# Patient Record
Sex: FEMALE | Race: OTHER | NOT HISPANIC OR LATINO | URBAN - METROPOLITAN AREA
[De-identification: names, ages, dates, MRNs, and addresses within clinical notes are randomized per-mention and may not be internally consistent; named-entity substitution may affect disease eponyms.]

---

## 2019-11-06 ENCOUNTER — EMERGENCY (EMERGENCY)
Facility: HOSPITAL | Age: 29
LOS: 0 days | Discharge: HOME | End: 2019-11-07
Attending: EMERGENCY MEDICINE | Admitting: EMERGENCY MEDICINE
Payer: SUBSIDIZED

## 2019-11-06 VITALS
HEART RATE: 92 BPM | SYSTOLIC BLOOD PRESSURE: 131 MMHG | OXYGEN SATURATION: 100 % | RESPIRATION RATE: 17 BRPM | TEMPERATURE: 96 F | DIASTOLIC BLOOD PRESSURE: 79 MMHG

## 2019-11-06 DIAGNOSIS — R31.9 HEMATURIA, UNSPECIFIED: ICD-10-CM

## 2019-11-06 DIAGNOSIS — R10.9 UNSPECIFIED ABDOMINAL PAIN: ICD-10-CM

## 2019-11-06 DIAGNOSIS — R10.31 RIGHT LOWER QUADRANT PAIN: ICD-10-CM

## 2019-11-06 LAB
ALBUMIN SERPL ELPH-MCNC: 4.7 G/DL — SIGNIFICANT CHANGE UP (ref 3.5–5.2)
ALP SERPL-CCNC: 40 U/L — SIGNIFICANT CHANGE UP (ref 30–115)
ALT FLD-CCNC: 12 U/L — SIGNIFICANT CHANGE UP (ref 0–41)
ANION GAP SERPL CALC-SCNC: 20 MMOL/L — HIGH (ref 7–14)
APPEARANCE UR: CLEAR — SIGNIFICANT CHANGE UP
APTT BLD: 24.7 SEC — LOW (ref 27–39.2)
AST SERPL-CCNC: 21 U/L — SIGNIFICANT CHANGE UP (ref 0–41)
BACTERIA # UR AUTO: ABNORMAL
BASOPHILS # BLD AUTO: 0.03 K/UL — SIGNIFICANT CHANGE UP (ref 0–0.2)
BASOPHILS NFR BLD AUTO: 0.5 % — SIGNIFICANT CHANGE UP (ref 0–1)
BILIRUB SERPL-MCNC: 0.2 MG/DL — SIGNIFICANT CHANGE UP (ref 0.2–1.2)
BILIRUB UR-MCNC: NEGATIVE — SIGNIFICANT CHANGE UP
BUN SERPL-MCNC: 13 MG/DL — SIGNIFICANT CHANGE UP (ref 10–20)
CALCIUM SERPL-MCNC: 9.8 MG/DL — SIGNIFICANT CHANGE UP (ref 8.5–10.1)
CHLORIDE SERPL-SCNC: 100 MMOL/L — SIGNIFICANT CHANGE UP (ref 98–110)
CO2 SERPL-SCNC: 19 MMOL/L — SIGNIFICANT CHANGE UP (ref 17–32)
COLOR SPEC: SIGNIFICANT CHANGE UP
CREAT SERPL-MCNC: 0.6 MG/DL — LOW (ref 0.7–1.5)
DIFF PNL FLD: ABNORMAL
EOSINOPHIL # BLD AUTO: 0.1 K/UL — SIGNIFICANT CHANGE UP (ref 0–0.7)
EOSINOPHIL NFR BLD AUTO: 1.6 % — SIGNIFICANT CHANGE UP (ref 0–8)
EPI CELLS # UR: 3 /HPF — SIGNIFICANT CHANGE UP (ref 0–5)
GLUCOSE SERPL-MCNC: 94 MG/DL — SIGNIFICANT CHANGE UP (ref 70–99)
GLUCOSE UR QL: NEGATIVE — SIGNIFICANT CHANGE UP
HCG SERPL-ACNC: <0.6 MIU/ML — SIGNIFICANT CHANGE UP
HCT VFR BLD CALC: 40.1 % — SIGNIFICANT CHANGE UP (ref 37–47)
HGB BLD-MCNC: 13.4 G/DL — SIGNIFICANT CHANGE UP (ref 12–16)
HYALINE CASTS # UR AUTO: 0 /LPF — SIGNIFICANT CHANGE UP (ref 0–7)
IMM GRANULOCYTES NFR BLD AUTO: 0.2 % — SIGNIFICANT CHANGE UP (ref 0.1–0.3)
INR BLD: 1.01 RATIO — SIGNIFICANT CHANGE UP (ref 0.65–1.3)
KETONES UR-MCNC: ABNORMAL
LEUKOCYTE ESTERASE UR-ACNC: NEGATIVE — SIGNIFICANT CHANGE UP
LIDOCAIN IGE QN: 25 U/L — SIGNIFICANT CHANGE UP (ref 7–60)
LYMPHOCYTES # BLD AUTO: 2.87 K/UL — SIGNIFICANT CHANGE UP (ref 1.2–3.4)
LYMPHOCYTES # BLD AUTO: 44.8 % — SIGNIFICANT CHANGE UP (ref 20.5–51.1)
MCHC RBC-ENTMCNC: 28.8 PG — SIGNIFICANT CHANGE UP (ref 27–31)
MCHC RBC-ENTMCNC: 33.4 G/DL — SIGNIFICANT CHANGE UP (ref 32–37)
MCV RBC AUTO: 86.1 FL — SIGNIFICANT CHANGE UP (ref 81–99)
MONOCYTES # BLD AUTO: 0.43 K/UL — SIGNIFICANT CHANGE UP (ref 0.1–0.6)
MONOCYTES NFR BLD AUTO: 6.7 % — SIGNIFICANT CHANGE UP (ref 1.7–9.3)
NEUTROPHILS # BLD AUTO: 2.96 K/UL — SIGNIFICANT CHANGE UP (ref 1.4–6.5)
NEUTROPHILS NFR BLD AUTO: 46.2 % — SIGNIFICANT CHANGE UP (ref 42.2–75.2)
NITRITE UR-MCNC: NEGATIVE — SIGNIFICANT CHANGE UP
NRBC # BLD: 0 /100 WBCS — SIGNIFICANT CHANGE UP (ref 0–0)
PH UR: 6.5 — SIGNIFICANT CHANGE UP (ref 5–8)
PLATELET # BLD AUTO: 304 K/UL — SIGNIFICANT CHANGE UP (ref 130–400)
POTASSIUM SERPL-MCNC: 4.4 MMOL/L — SIGNIFICANT CHANGE UP (ref 3.5–5)
POTASSIUM SERPL-SCNC: 4.4 MMOL/L — SIGNIFICANT CHANGE UP (ref 3.5–5)
PROT SERPL-MCNC: 8.1 G/DL — HIGH (ref 6–8)
PROT UR-MCNC: SIGNIFICANT CHANGE UP
PROTHROM AB SERPL-ACNC: 11.6 SEC — SIGNIFICANT CHANGE UP (ref 9.95–12.87)
RBC # BLD: 4.66 M/UL — SIGNIFICANT CHANGE UP (ref 4.2–5.4)
RBC # FLD: 13 % — SIGNIFICANT CHANGE UP (ref 11.5–14.5)
RBC CASTS # UR COMP ASSIST: 15 /HPF — HIGH (ref 0–4)
SODIUM SERPL-SCNC: 139 MMOL/L — SIGNIFICANT CHANGE UP (ref 135–146)
SP GR SPEC: 1.02 — SIGNIFICANT CHANGE UP (ref 1.01–1.02)
UROBILINOGEN FLD QL: SIGNIFICANT CHANGE UP
WBC # BLD: 6.4 K/UL — SIGNIFICANT CHANGE UP (ref 4.8–10.8)
WBC # FLD AUTO: 6.4 K/UL — SIGNIFICANT CHANGE UP (ref 4.8–10.8)
WBC UR QL: 3 /HPF — SIGNIFICANT CHANGE UP (ref 0–5)

## 2019-11-06 PROCEDURE — 99283 EMERGENCY DEPT VISIT LOW MDM: CPT

## 2019-11-06 PROCEDURE — 76856 US EXAM PELVIC COMPLETE: CPT | Mod: 26

## 2019-11-06 PROCEDURE — 74177 CT ABD & PELVIS W/CONTRAST: CPT | Mod: 26

## 2019-11-06 PROCEDURE — 99285 EMERGENCY DEPT VISIT HI MDM: CPT

## 2019-11-06 RX ORDER — SODIUM CHLORIDE 9 MG/ML
1000 INJECTION INTRAMUSCULAR; INTRAVENOUS; SUBCUTANEOUS ONCE
Refills: 0 | Status: COMPLETED | OUTPATIENT
Start: 2019-11-06 | End: 2019-11-06

## 2019-11-06 RX ORDER — MORPHINE SULFATE 50 MG/1
4 CAPSULE, EXTENDED RELEASE ORAL ONCE
Refills: 0 | Status: DISCONTINUED | OUTPATIENT
Start: 2019-11-06 | End: 2019-11-06

## 2019-11-06 RX ORDER — KETOROLAC TROMETHAMINE 30 MG/ML
15 SYRINGE (ML) INJECTION ONCE
Refills: 0 | Status: DISCONTINUED | OUTPATIENT
Start: 2019-11-06 | End: 2019-11-06

## 2019-11-06 RX ORDER — MORPHINE SULFATE 50 MG/1
2 CAPSULE, EXTENDED RELEASE ORAL ONCE
Refills: 0 | Status: DISCONTINUED | OUTPATIENT
Start: 2019-11-06 | End: 2019-11-06

## 2019-11-06 RX ADMIN — Medication 15 MILLIGRAM(S): at 19:53

## 2019-11-06 RX ADMIN — MORPHINE SULFATE 4 MILLIGRAM(S): 50 CAPSULE, EXTENDED RELEASE ORAL at 18:25

## 2019-11-06 RX ADMIN — SODIUM CHLORIDE 1000 MILLILITER(S): 9 INJECTION INTRAMUSCULAR; INTRAVENOUS; SUBCUTANEOUS at 18:25

## 2019-11-06 NOTE — CONSULT NOTE ADULT - ASSESSMENT
29y G0 with LMP 10/22, with abdominal pain, now resolved, likely with hemorrhagic ovarian cyst, cannot rule out fibroid, unlikely ovarian torsion, clinically and hemodynamically stable, no acute GYN intervention at this time    -Recommend Naproxen 500mg [id number]  -Follow up transvaginal sonogram on Friday 11/8- GYN team to make appointment  -Pain precautions  -Dispo per ED    Discussed with Dr. Barton and Dr. Valdemar Lofton

## 2019-11-06 NOTE — CONSULT NOTE ADULT - SUBJECTIVE AND OBJECTIVE BOX
Chief complaint: abdominal cramping    29y G0 with LMP 10/22, presents to the ED with abdominal cramping with radiation to the perispinal region. Patient reports lower abdominal cramps started 4 days ago, intermittent, varying in intensity, became 10/10 which prompted her to come in. She denies fevers, chills, chest pain, N/V, SOB, palpitations, urinary symptoms, changes in her bowel habits.  Ob/Gyn History:  G0                 LMP 10/22                Patient on Tri-lo Sprintec for contraception  Denies history of ovarian cysts, uterine fibroids, abnormal paps, or STIs  Denies the following: constitutional symptoms, visual symptoms, cardiovascular symptoms, respiratory symptoms, GI symptoms, musculoskeletal symptoms, skin symptoms, neurologic symptoms, hematologic symptoms, allergic symptoms, psychiatric symptoms  Except any pertinent positives listed.     PAST MEDICAL & SURGICAL HISTORY: None    Home Medications: Tri-lo- sprintec    Allergies: No Known Allergies    FAMILY HISTORY:    SOCIAL HISTORY: Denies cigarette use, alcohol use, or illicit drug use    Vital Signs Last 24 Hrs  T(F): 96.1 (2019 17:42), Max: 96.1 (2019 17:42)  HR: 92 (2019 17:42) (92 - 92)  BP: 131/79 (2019 17:42) (131/79 - 131/79)  RR: 17 (2019 17:42) (17 - 17)    General Appearance - AAOx3, NAD  Heart - S1S2 regular rate and rhythm  Lung - CTA Bilaterally  Abdomen - Soft, nontender, nondistended, no rebound, no rigidity, no guarding, bowel sounds present    GYN/Pelvis:    External genitalia appears normal  Speculum: cervix appears normal, no lesions masses or bleeding noted  Bimanual: No CMT. Uterus normal in size, non-tender, no adnexal masses or tenderness.      LABS:                        13.4   6.40  )-----------( 304      ( 2019 18:20 )             40.1     HCG Quantitative, Serum: <0.6 mIU/mL (19 @ 18:20)          139  |  100  |  13  ----------------------------<  94  4.4   |  19  |  0.6<L>    Ca    9.8      2019 18:20    TPro  8.1<H>  /  Alb  4.7  /  TBili  0.2  /  DBili  x   /  AST  21  /  ALT  12  /  AlkPhos  40  11-    PT/INR - ( 2019 18:20 )   PT: 11.60 sec;   INR: 1.01 ratio         PTT - ( 2019 18:20 )  PTT:24.7 sec  Urinalysis Basic - ( 2019 18:30 )    Color: Light Yellow / Appearance: Clear / S.018 / pH: x  Gluc: x / Ketone: Small  / Bili: Negative / Urobili: <2 mg/dL   Blood: x / Protein: Trace / Nitrite: Negative   Leuk Esterase: Negative / RBC: 15 /HPF / WBC 3 /HPF   Sq Epi: x / Non Sq Epi: 3 /HPF / Bacteria: Moderate          RADIOLOGY & ADDITIONAL STUDIES:    EXAM:  US PELVIC COMPLETE            PROCEDURE DATE:  2019      INTERPRETATION:  CLINICAL HISTORY: Right pelvic mass.    LMP: 10/23/2019.    FINDINGS:  UTERUS: Anteverted measuring 8.1 x 4.4 x 3.9 cm, with normal echogenicity   and morphology. The endometrial echo complex measures 0.6 cm, which is   normal n thickness.   RIGHT OVARY: Vascular adnexal heterogeneous structure measuring 4.8 x 4.4   x 4.2 cm. Doppler flow is demonstrated to the right ovary.   LEFT OVARY: Not visualized.  OTHER: No free fluid in the pelvis.      IMPRESSION:  Right adnexal heterogeneous structure measuring 4.8 cm. Follow-up with   MRI with and without contrast may be obtained for further evaluation.        EXAM:  CT ABDOMEN AND PELVIS IC            PROCEDURE DATE:  2019    INTERPRETATION:  Clinical History / Reason for exam: Right flank pain  Comparison CT: None    FINDINGS:    LOWER CHEST: There is mild bibasilar subsegmental atelectasis.    HEPATOBILIARY: The liver is normal in appearance.  No evidence of intra   or extrahepatic biliary dilatation. The gallbladder is suboptimally   imaged.    KIDNEYS:Symmetric pattern of renal enhancement. No evidence of a mass,   hydronephrosis or hydroureter.    ABDOMINOPELVIC NODES: No enlarged abdominal or pelvic lymph nodes.  PELVIC ORGANS: Arising from the right adnexa or anterior margin of the   uterus, there is a high density ovoid well-circumscribed mass measuring   6.4 x 3.8 cm.  PERITONEUM/MESENTERY/BOWEL: No bowel obstruction, ascites or free   intraperitoneal air. The appendix is normal in caliber  BONES/SOFT TISSUES: L1-L2 posterior disc osteophyte complex is noted.   VASCULAR:  The aorta is normal in caliber.  IMPRESSION:  Mass arising from the right adnexa or uterus may reflect a fibroid or   hemorrhagic cyst. Further evaluation with pelvic ultrasound and/or MRI is   recommended.

## 2019-11-06 NOTE — ED PROVIDER NOTE - PHYSICAL EXAMINATION
Vital Signs: I have reviewed the initial vital signs.  Constitutional: NAD, well-nourished, appears stated age, no acute distress.  HEENT: Airway patent, moist MM, no erythema/swelling/deformity of oral structures. EOMI, PERRLA.  CV: regular rate, regular rhythm, well-perfused extremities, 2+ b/l DP and radial pulses equal.  Lungs: BCTA, no increased WOB.  ABD: suprapubic discomfort to palpation.  NTND, no guarding or rebound, no pulsatile mass, no hernias.   MSK: Neck supple, nontender, nl ROM, no stepoff. Chest nontender. Back nontender in TLS spine or to b/l bony structures or flanks. Ext nontender, nl rom, no deformity. R CVA ttp  INTEG: Skin warm, dry, no rash.  NEURO: A&Ox3, moving all extremities, normal speech  PSYCH: Calm, cooperative, normal affect and interaction.

## 2019-11-06 NOTE — ED PROVIDER NOTE - ATTENDING CONTRIBUTION TO CARE
29 year old female, no sig pmhx, come sin with right flank pain, dull, non radiating, no dysuria, + mild on and off blood in urine, no cp/sob, no fever, no n/v/d, no cp/sob    CONSTITUTIONAL: Well-developed; well-nourished; in no acute distress. Sitting up and providing appropriate history and physical examination  SKIN: skin exam is warm and dry, no acute rash.  HEAD: Normocephalic; atraumatic.  EYES: PERRL, 3 mm bilateral, no nystagmus, EOM intact; conjunctiva and sclera clear.  ENT: No nasal discharge; airway clear.  NECK: Supple; non tender. + full passive ROM in all directions. No JVD  ABD: soft; non-distended; + right CVA tenderness. No Rebound, No Guarding, No signs of peritonitis, No left CVA tenderness. No pulsatile abdominal mass. + Strong and Symmetric Pulses  EXT: Normal ROM. No clubbing, cyanosis or edema. Dp and Pt Pulses intact. Cap refill less than 3 seconds  NEURO: CN 2-12 intact, normal finger to nose, normal romberg, stable gait, no sensory or motor deficits, Alert, oriented, grossly unremarkable. No Focal deficits. GCS 15. NIH 0  PSYCH: Cooperative, appropriate.

## 2019-11-06 NOTE — ED PROVIDER NOTE - NSFOLLOWUPCLINICS_GEN_ALL_ED_FT
[facility]  OB/GYN  [address] [place] [address]  Phone: [phone]  Fax:   Follow Up Time: 4-6 Days

## 2019-11-06 NOTE — ED PROVIDER NOTE - PATIENT PORTAL LINK FT
You can access the FollowMyHealth Patient Portal offered by [facility] by registering at the following website: [email or web address]. By joining [employer]’s FollowMyHealth portal, you will also be able to view your health information using other applications (apps) compatible with our system.

## 2019-11-06 NOTE — ED PROVIDER NOTE - OBJECTIVE STATEMENT
29y G0, lmp 10/20 p/w mild suprapubic intermittent cramping abd pain radiating to R back X 4 days, gradual onset, sometimes waking her from sleep.   no fevers, chills, chest pain, N/V/D, SOB, palpitations, urinary symptoms, changes in her bowel habits.

## 2019-11-06 NOTE — ED PROVIDER NOTE - MDM PATIENT STATEMENT FOR ADDL TREATMENT
<<-----Click here for Discharge Medication Review
Patient with one or more new problems requiring additional work-up/treatment.

## 2019-11-06 NOTE — ED PROVIDER NOTE - CLINICAL SUMMARY MEDICAL DECISION MAKING FREE TEXT BOX
I personally evaluated the patient. I reviewed the Resident’s or Physician Assistant’s note (as assigned above), and agree with the findings and plan except as documented in my note. Patient evaluated for 3 days of on and off abdominal pain, found to have a heterogenous mass on right adenxa, + blood flow to ovaries, no torsion, however patient declined transvaginal US despite obgyn recommendation. Patient declined admission, evaluated by obgyn, opted for OP MRI. I have fully discussed the medical management and delivery of care with the patient. I have discussed any available labs, imaging and treatment options with the patient. Patient confirms understanding and has been given detailed return precautions. Patient instructed to return to the ED should symptoms persist or worsen. Patient has demonstrated capacity and has verbalized understanding. Patient is well appearing upon discharge.

## 2019-11-06 NOTE — ED PROVIDER NOTE - NS ED ROS FT
Eyes:  No visual changes, eye pain or discharge.  ENMT:  No hearing changes, pain, no sore throat or runny nose, no difficulty swallowing  Cardiac:  No chest pain, SOB or edema. No chest pain with exertion.  Respiratory:  No cough or respiratory distress.    GI:  No nausea, vomiting, diarrhea . +abdominal pain.  :  No dysuria, frequency or burning.  MS:  No myalgia, muscle weakness, joint pain . +back pain.  Neuro:  No headache or weakness.  No LOC.  Skin:  No skin rash.   Endocrine: No history of thyroid disease or diabetes.

## 2019-11-06 NOTE — ED ADULT NURSE NOTE - NSIMPLEMENTINTERV_GEN_ALL_ED
137/current
Implemented All Universal Safety Interventions:  [place] to call system. Call bell, personal items and telephone within reach. Instruct patient to call for assistance. Room bathroom lighting operational. Non-slip footwear when patient is off stretcher. Physically safe environment: no spills, clutter or unnecessary equipment. Stretcher in lowest position, wheels locked, appropriate side rails in place.

## 2019-11-08 ENCOUNTER — OUTPATIENT (OUTPATIENT)
Dept: OUTPATIENT SERVICES | Facility: HOSPITAL | Age: 29
LOS: 1 days | Discharge: HOME | End: 2019-11-08

## 2019-11-08 ENCOUNTER — APPOINTMENT (OUTPATIENT)
Dept: GYNECOLOGIC ONCOLOGY | Facility: CLINIC | Age: 29
End: 2019-11-08
Payer: COMMERCIAL

## 2019-11-08 VITALS
DIASTOLIC BLOOD PRESSURE: 78 MMHG | TEMPERATURE: 98.2 F | WEIGHT: 136.69 LBS | HEIGHT: 62.99 IN | SYSTOLIC BLOOD PRESSURE: 126 MMHG | BODY MASS INDEX: 24.22 KG/M2

## 2019-11-08 DIAGNOSIS — R19.00 INTRA-ABDOMINAL AND PELVIC SWELLING, MASS AND LUMP, UNSPECIFIED SITE: ICD-10-CM

## 2019-11-08 DIAGNOSIS — Z78.9 OTHER SPECIFIED HEALTH STATUS: ICD-10-CM

## 2019-11-08 PROCEDURE — 99203 OFFICE O/P NEW LOW 30 MIN: CPT | Mod: 25

## 2019-11-08 NOTE — PAST MEDICAL HISTORY
[Definite ___ (Date)] : the last menstrual period was [unfilled] [Regular Cycle Intervals] : have been regular [Normal Duration] : the duration was normal

## 2019-11-08 NOTE — DISCUSSION/SUMMARY
[FreeTextEntry1] : 29-year-old female  presented to the emergency room on  with right flank pain and right pelvic pain with a pain score of 10 out of 10. Her workup with CT scan and sonography revealed a 4 x 5 cm right hemorrhagic ovarian cyst. The patient presents for a followup evaluation today. She denies any pain vaginal bleeding or vaginal discharge.\par Options for surgical management as well as conservative management discussed. Procedures offered patient included diagnostic laparoscopy, with possible ovarian cystectomy vs clinical follow up with a repeat sonogram in 2 months.\par The patient was informed that if her symptoms recur that diagnostic laparoscopy would be recommended. However in the absence of any pain or discomfort a repeat sonogram is warranted in 2 months to assess for spontaneous resolution of the above hemorrhagic cyst. The patient will be set up for repeat sonogram in a followup visit in 2 months.  A CA-125 was also requested.\par \par \par \par \par \par \par

## 2019-11-08 NOTE — HISTORY OF PRESENT ILLNESS
[FreeTextEntry1] : 29-year-old female  presented to the emergency room on  with right flank pain and right pelvic pain with a pain score of 10 out of 10. Her workup with CT scan and sonography revealed a 4 x 5 cm right hemorrhagic ovarian cyst. The patient presents for a followup evaluation today. She denies any pain vaginal bleeding or vaginal discharge.\par \par

## 2019-11-08 NOTE — OB HISTORY
[Total Preg ___] : : [unfilled] [Living ___] : [unfilled] (living) [Definite ___ (Date)] : the last menstrual period was [unfilled] [Menarche Age ____] : age at menarche was [unfilled]

## 2019-11-12 ENCOUNTER — APPOINTMENT (OUTPATIENT)
Dept: ANTEPARTUM | Facility: CLINIC | Age: 29
End: 2019-11-12

## 2019-11-20 ENCOUNTER — OUTPATIENT (OUTPATIENT)
Dept: OUTPATIENT SERVICES | Facility: HOSPITAL | Age: 29
LOS: 1 days | Discharge: HOME | End: 2019-11-20

## 2019-11-21 DIAGNOSIS — N83.201 UNSPECIFIED OVARIAN CYST, RIGHT SIDE: ICD-10-CM

## 2020-01-30 ENCOUNTER — EMERGENCY (EMERGENCY)
Facility: HOSPITAL | Age: 30
LOS: 0 days | Discharge: HOME | End: 2020-01-30
Attending: EMERGENCY MEDICINE | Admitting: EMERGENCY MEDICINE
Payer: COMMERCIAL

## 2020-01-30 VITALS
HEART RATE: 95 BPM | SYSTOLIC BLOOD PRESSURE: 121 MMHG | RESPIRATION RATE: 20 BRPM | TEMPERATURE: 98 F | OXYGEN SATURATION: 99 % | DIASTOLIC BLOOD PRESSURE: 58 MMHG

## 2020-01-30 DIAGNOSIS — S82.892A OTHER FRACTURE OF LEFT LOWER LEG, INITIAL ENCOUNTER FOR CLOSED FRACTURE: ICD-10-CM
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
## 2020-01-30 DIAGNOSIS — M25.572 PAIN IN LEFT ANKLE AND JOINTS OF LEFT FOOT: ICD-10-CM
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
## 2020-01-30 PROCEDURE — 73600 X-RAY EXAM OF ANKLE: CPT | Mod: 26,52,59,LT

## 2020-01-30 PROCEDURE — 73610 X-RAY EXAM OF ANKLE: CPT | Mod: 26,LT

## 2020-01-30 PROCEDURE — 99284 EMERGENCY DEPT VISIT MOD MDM: CPT

## 2020-01-30 RX ORDER — IBUPROFEN 200 MG
1 TABLET ORAL
Qty: 30 | Refills: 0
Start: 2020-01-30 | End: 2020-02-08

## 2020-01-30 RX ORDER — IBUPROFEN 200 MG
600 TABLET ORAL ONCE
Refills: 0 | Status: COMPLETED | OUTPATIENT
Start: 2020-01-30 | End: 2020-01-30

## 2020-01-30 RX ADMIN — Medication 600 MILLIGRAM(S): at 11:43

## 2020-01-30 NOTE — ED PROVIDER NOTE - NSFOLLOWUPINSTRUCTIONS_ED_ALL_ED_FT
Ankle Fracture    WHAT YOU NEED TO KNOW:    An ankle fracture is a break in 1 or more of the bones in your ankle. Foot Anatomy         DISCHARGE INSTRUCTIONS:    Call 911 for any of the following:     You feel lightheaded, short of breath, and have chest pain.      You cough up blood.    Return to the emergency department if:     Your leg feels warm, tender, and painful. It may look swollen and red.      Blood soaks through your bandage.      You have severe pain in your ankle.      Your cast feels too tight.      Your foot or toes are cold or numb.      Your foot or toenails turn blue or gray.    Contact your healthcare provider if:     Your splint feels too tight.      Your swelling has increased or returned.      You have a fever.      Your pain does not go away, even after treatment.      You have questions or concerns about your condition or care.    Medicines: You may need any of the following:     Acetaminophen decreases pain and fever. It is available without a doctor's order. Ask how much to take and how often to take it. Follow directions. Acetaminophen can cause liver damage if not taken correctly.      NSAIDs, such as ibuprofen, help decrease swelling, pain, and fever. This medicine is available with or without a doctor's order. NSAIDs can cause stomach bleeding or kidney problems in certain people. If you take blood thinner medicine, always ask your healthcare provider if NSAIDs are safe for you. Always read the medicine label and follow directions.      Prescription pain medicine may be given. Ask your healthcare provider how to take this medicine safely.      Take your medicine as directed. Contact your healthcare provider if you think your medicine is not helping or if you have side effects. Tell him or her if you are allergic to any medicine. Keep a list of the medicines, vitamins, and herbs you take. Include the amounts, and when and why you take them. Bring the list or the pill bottles to follow-up visits. Carry your medicine list with you in case of an emergency.    Follow up with your healthcare provider in 1 to 2 days: Your fracture may need to be reduced (bones pushed back into place) or you may need surgery. Write down your questions so you remember to ask them during your visits.     Support devices: You will be given a brace, cast, or splint to limit your movement and protect your ankle. You may need to use crutches to protect your ankle and decrease your pain as you move around. Do not remove your device and do not put weight on your injured ankle.    Splint and cast care: Cover the splint or cast before you bathe so it does not get wet. Tape 2 plastic trash bags to your skin above the cast. Try to keep your ankle out of the water as much as possible.    Rest: Rest your ankle so that it can heal. Return to normal activities as directed.    Ice: Apply ice on your ankle for 15 to 20 minutes every hour or as directed. Use an ice pack, or put crushed ice in a plastic bag. Cover it with a towel. Ice helps prevent tissue damage and decreases swelling and pain.    Elevate: Elevate your ankle above the level of your heart as often as you can. This will help decrease swelling and pain. Prop your ankle on pillows or blankets to keep it elevated comfortably.        © Copyright [employer] 2019 All illustrations and images included in CareNotes are the copyrighted property of A.[employer].A[employer]M., Inc[employer] or [employer].

## 2020-01-30 NOTE — CONSULT NOTE ADULT - SUBJECTIVE AND OBJECTIVE BOX
Orthopaedics Consult Note    OLIVIA GOODE  [id number]    Time consult called: 1130  Time patient seen: 1200    Patient is a 29y year old Female with left ankle fracture  She fell down the stairs  Complained of left ankle pain  XR showed left distal fibula fracture  Closed injury  Stress XR done by Ortho negative for medical clear space widening or syndesmosis injury    PMH/PSH  ^INJURED ANKLE  MEWS Score  INJURED ANKLE  84      Medications      Allergies  No Known Allergies        T(C): 36.9 (01-30-20 @ 10:35), Max: 36.9 (01-30-20 @ 10:35)  HR: 95 (01-30-20 @ 10:35) (95 - 95)  BP: 121/58 (01-30-20 @ 10:35) (121/58 - 121/58)  RR: 20 (01-30-20 @ 10:35) (20 - 20)  SpO2: 99% (01-30-20 @ 10:35) (99% - 99%)    Physical Exam  NAD  Breathing comfortably on RA  Resting comfortably  LLE  TTP over left ankle lateral malleolus only  No open wounds  Moderate swelling  No deformity/laceration/abrasion noted  No swelling/erythema/ecchymosis noted  Motor: TA/EHL/Gastroc intact  Sensory: SP/DP/Smith/Sa intact  Vasc: foot WWP, 2+ DP pulse    Img  Left ankle XR  Left distal fibula fracture  No other fracture  No talar subluxation on stress XR    Procedure  Left leg short leg cast applied    A/P: Patient is a 29y year old Female with left ankle fracture s/p short leg walking cast application    WBAT on LLE  Cast care instructions provided  Return to clinic: Orthopaedics with Dr. Bullock/Demetrio/Adam, please call [phone] to schedule an appointment for next week  Return to ED with uncontrolled pain/bleeding/fever/chills/numbness/tingling/cool extremity/inability to move extremity

## 2020-01-30 NOTE — ED PROVIDER NOTE - OBJECTIVE STATEMENT
28 y/o F, no significant PMHx, presents to the ED s/p mechanical fall with complaints of left ankle pain. Patient tripped and fell down approximately three stairs landing with her left foot in a dorsiflexed position. She admits to immediate swelling and increased pain upon ambulation; denies paresthesias, skin color changes and additional injuries.

## 2020-01-30 NOTE — ED PROVIDER NOTE - MUSCULOSKELETAL, MLM
+ tenderness and swelling along left lateral malleolus w/ increased pain upon dorsiflexion ; no tenderness along foot ;

## 2020-01-30 NOTE — ED ADULT NURSE NOTE - NSIMPLEMENTINTERV_GEN_ALL_ED
Implemented All Fall Risk Interventions:  [place] to call system. Call bell, personal items and telephone within reach. Instruct patient to call for assistance. Room bathroom lighting operational. Non-slip footwear when patient is off stretcher. Physically safe environment: no spills, clutter or unnecessary equipment. Stretcher in lowest position, wheels locked, appropriate side rails in place. Provide visual cue, wrist band, yellow gown, etc. Monitor gait and stability. Monitor for mental status changes and reorient to person, place, and time. Review medications for side effects contributing to fall risk. Reinforce activity limits and safety measures with patient and family.

## 2020-01-30 NOTE — ED PROVIDER NOTE - PATIENT PORTAL LINK FT
You can access the FollowMyHealth Patient Portal offered by [facility] by registering at the following website: [email or web address]. By joining [employer]’s FollowMyHealth portal, you will also be able to view your health information using other applications (apps) compatible with our system.

## 2020-01-30 NOTE — ED ADULT TRIAGE NOTE - CHIEF COMPLAINT QUOTE
s/p fall down 3 steps, patient reports missing the step. patient denies any LOC or anticoagulants. patient c/o left ankle pain, immobilization in place, PTA

## 2020-01-30 NOTE — ED ADULT NURSE NOTE - OBJECTIVE STATEMENT
Left lateral ankle swelling and tenderness s/p mechanical fall. good pulses and cap refill no obvious deformity. poor active ROM.  patient took percocet PTA . mild relief

## 2020-02-13 ENCOUNTER — OUTPATIENT (OUTPATIENT)
Dept: OUTPATIENT SERVICES | Facility: HOSPITAL | Age: 30
LOS: 1 days | Discharge: HOME | End: 2020-02-13
Payer: COMMERCIAL

## 2020-02-13 DIAGNOSIS — N83.201 UNSPECIFIED OVARIAN CYST, RIGHT SIDE: ICD-10-CM

## 2020-02-13 PROCEDURE — 76856 US EXAM PELVIC COMPLETE: CPT | Mod: 26

## 2020-02-13 PROCEDURE — 76830 TRANSVAGINAL US NON-OB: CPT | Mod: 26

## 2020-02-18 ENCOUNTER — APPOINTMENT (OUTPATIENT)
Dept: GYNECOLOGIC ONCOLOGY | Facility: CLINIC | Age: 30
End: 2020-02-18

## 2020-02-25 ENCOUNTER — APPOINTMENT (OUTPATIENT)
Dept: GYNECOLOGIC ONCOLOGY | Facility: CLINIC | Age: 30
End: 2020-02-25
Payer: COMMERCIAL

## 2020-02-25 VITALS
WEIGHT: 140 LBS | DIASTOLIC BLOOD PRESSURE: 70 MMHG | SYSTOLIC BLOOD PRESSURE: 110 MMHG | TEMPERATURE: 98.1 F | HEIGHT: 62 IN | BODY MASS INDEX: 25.76 KG/M2

## 2020-02-25 VITALS
SYSTOLIC BLOOD PRESSURE: 112 MMHG | BODY MASS INDEX: 25.03 KG/M2 | DIASTOLIC BLOOD PRESSURE: 72 MMHG | HEIGHT: 62 IN | WEIGHT: 136 LBS | TEMPERATURE: 98 F

## 2020-02-25 DIAGNOSIS — Z87.898 PERSONAL HISTORY OF OTHER SPECIFIED CONDITIONS: ICD-10-CM

## 2020-02-25 PROCEDURE — 99213 OFFICE O/P EST LOW 20 MIN: CPT

## 2020-02-28 PROBLEM — Z87.898 HISTORY OF RIGHT FLANK PAIN: Status: RESOLVED | Noted: 2019-11-08 | Resolved: 2020-02-28

## 2020-02-28 NOTE — ASSESSMENT
[FreeTextEntry1] : 29-year-old female  with a history of pelvic pain secondary to a hemorrhagic  ovarian  neoplasm which appears to be resolved. Now presenting with a likely pedunculated myoma requesting further management.\par Sonogram performed 2020 revealed a uterus that is anteverted measuring 10.9 x 5 x 3.9 cm. A solid mass measuring 4.5 x 6.3 x 3.9 cm in the region of the right adnexa adhered to the uterus was noted to be stable in size since 2019. The  right ovary measured 2.6 x 1.7 x 1.4 cm and was unremarkable. The left ovary was 2.9 x 2.7 x 1.4 cm with punctate calcification measuring 3 mm. Doppler flow was demonstrated. Impression was that this mass is consistent with a pedunculated uterine fibroid. An MRI was recommended with and without intravenous contrast for confirmation.\par The patient states that she is pain free,  except for occasional discomfort  during intercourse alleviated by NSAID.  The patient is requesting an MRI to assess the above mass. She  is currently taking OC to prevent pregnancy at this time.\par \par PLAN\par -F/U Visit in 6 weeks\par -Set up for MRI of pelvis \par -D/C Oc's for now.\par -No indication for surgical intervention at this time.\par \par \par \par

## 2020-02-28 NOTE — HISTORY OF PRESENT ILLNESS
[FreeTextEntry1] : 29-year-old female  presented to the emergency room on  with right flank pain and right pelvic pain with a pain score of 10 out of 10. Her workup with CT scan and sonography revealed a 4 x 5 cm right hemorrhagic ovarian cyst. The patient presents for a followup evaluation today. She denies any pain vaginal bleeding or vaginal discharge.\par \par Sonogram performed 2020 revealed a uterus that is anteverted measuring 10.9 x 5 x 3.9 cm. A solid mass measuring 4.5 x 6.3 x 3.9 cm in the region of the right adnexa adhered to the uterus was noted to be stable in size since 2019. The  right ovary measured 2.6 x 1.7 x 1.4 cm and was unremarkable. The left ovary was 2.9 x 2.7 x 1.4 cm with punctate calcification measuring 3 mm. Doppler flow was demonstrated. Impression was that this mass is consistent with a pedunculated uterine fibroid. An MRI was recommended with and without intravenous contrast for confirmation.\par The patient states that she is pain free,  except for occasional discomfort  during intercourse alleviated by NSAID.  The patient is requesting an MRI to assess the above mass. She  is currently taking OC to prevent pregnancy at this time.

## 2020-08-07 ENCOUNTER — EMERGENCY (EMERGENCY)
Facility: HOSPITAL | Age: 30
LOS: 0 days | Discharge: HOME | End: 2020-08-07
Attending: EMERGENCY MEDICINE | Admitting: EMERGENCY MEDICINE
Payer: COMMERCIAL

## 2020-08-07 VITALS
HEIGHT: 64 IN | RESPIRATION RATE: 18 BRPM | HEART RATE: 78 BPM | TEMPERATURE: 99 F | WEIGHT: 164.91 LBS | DIASTOLIC BLOOD PRESSURE: 80 MMHG | SYSTOLIC BLOOD PRESSURE: 130 MMHG | OXYGEN SATURATION: 100 %

## 2020-08-07 VITALS
RESPIRATION RATE: 16 BRPM | TEMPERATURE: 98 F | SYSTOLIC BLOOD PRESSURE: 113 MMHG | HEART RATE: 85 BPM | OXYGEN SATURATION: 100 % | DIASTOLIC BLOOD PRESSURE: 68 MMHG

## 2020-08-07 DIAGNOSIS — R10.9 UNSPECIFIED ABDOMINAL PAIN: ICD-10-CM

## 2020-08-07 DIAGNOSIS — O34.11 MATERNAL CARE FOR BENIGN TUMOR OF CORPUS UTERI, FIRST TRIMESTER: ICD-10-CM

## 2020-08-07 LAB
ALBUMIN SERPL ELPH-MCNC: 4.3 G/DL — SIGNIFICANT CHANGE UP (ref 3.5–5.2)
ALP SERPL-CCNC: 49 U/L — SIGNIFICANT CHANGE UP (ref 30–115)
ALT FLD-CCNC: 14 U/L — SIGNIFICANT CHANGE UP (ref 0–41)
ANION GAP SERPL CALC-SCNC: 12 MMOL/L — SIGNIFICANT CHANGE UP (ref 7–14)
APPEARANCE UR: CLEAR — SIGNIFICANT CHANGE UP
AST SERPL-CCNC: 22 U/L — SIGNIFICANT CHANGE UP (ref 0–41)
BACTERIA # UR AUTO: NEGATIVE — SIGNIFICANT CHANGE UP
BASOPHILS # BLD AUTO: 0.03 K/UL — SIGNIFICANT CHANGE UP (ref 0–0.2)
BASOPHILS NFR BLD AUTO: 0.4 % — SIGNIFICANT CHANGE UP (ref 0–1)
BILIRUB SERPL-MCNC: 0.3 MG/DL — SIGNIFICANT CHANGE UP (ref 0.2–1.2)
BILIRUB UR-MCNC: NEGATIVE — SIGNIFICANT CHANGE UP
BLD GP AB SCN SERPL QL: SIGNIFICANT CHANGE UP
BUN SERPL-MCNC: 16 MG/DL — SIGNIFICANT CHANGE UP (ref 10–20)
CALCIUM SERPL-MCNC: 9.2 MG/DL — SIGNIFICANT CHANGE UP (ref 8.5–10.1)
CHLORIDE SERPL-SCNC: 103 MMOL/L — SIGNIFICANT CHANGE UP (ref 98–110)
CO2 SERPL-SCNC: 21 MMOL/L — SIGNIFICANT CHANGE UP (ref 17–32)
COLOR SPEC: SIGNIFICANT CHANGE UP
CREAT SERPL-MCNC: 0.6 MG/DL — LOW (ref 0.7–1.5)
DIFF PNL FLD: ABNORMAL
EOSINOPHIL # BLD AUTO: 0.18 K/UL — SIGNIFICANT CHANGE UP (ref 0–0.7)
EOSINOPHIL NFR BLD AUTO: 2.3 % — SIGNIFICANT CHANGE UP (ref 0–8)
EPI CELLS # UR: 1 /HPF — SIGNIFICANT CHANGE UP (ref 0–5)
GLUCOSE SERPL-MCNC: 98 MG/DL — SIGNIFICANT CHANGE UP (ref 70–99)
GLUCOSE UR QL: NEGATIVE — SIGNIFICANT CHANGE UP
HCG SERPL-ACNC: 2422 MIU/ML — HIGH
HCT VFR BLD CALC: 37.3 % — SIGNIFICANT CHANGE UP (ref 37–47)
HGB BLD-MCNC: 12.5 G/DL — SIGNIFICANT CHANGE UP (ref 12–16)
HYALINE CASTS # UR AUTO: 1 /LPF — SIGNIFICANT CHANGE UP (ref 0–7)
IMM GRANULOCYTES NFR BLD AUTO: 0.4 % — HIGH (ref 0.1–0.3)
KETONES UR-MCNC: NEGATIVE — SIGNIFICANT CHANGE UP
LACTATE SERPL-SCNC: 0.9 MMOL/L — SIGNIFICANT CHANGE UP (ref 0.7–2)
LEUKOCYTE ESTERASE UR-ACNC: NEGATIVE — SIGNIFICANT CHANGE UP
LIDOCAIN IGE QN: 26 U/L — SIGNIFICANT CHANGE UP (ref 7–60)
LYMPHOCYTES # BLD AUTO: 3.4 K/UL — SIGNIFICANT CHANGE UP (ref 1.2–3.4)
LYMPHOCYTES # BLD AUTO: 43.4 % — SIGNIFICANT CHANGE UP (ref 20.5–51.1)
MCHC RBC-ENTMCNC: 28.8 PG — SIGNIFICANT CHANGE UP (ref 27–31)
MCHC RBC-ENTMCNC: 33.5 G/DL — SIGNIFICANT CHANGE UP (ref 32–37)
MCV RBC AUTO: 85.9 FL — SIGNIFICANT CHANGE UP (ref 81–99)
MONOCYTES # BLD AUTO: 0.64 K/UL — HIGH (ref 0.1–0.6)
MONOCYTES NFR BLD AUTO: 8.2 % — SIGNIFICANT CHANGE UP (ref 1.7–9.3)
NEUTROPHILS # BLD AUTO: 3.56 K/UL — SIGNIFICANT CHANGE UP (ref 1.4–6.5)
NEUTROPHILS NFR BLD AUTO: 45.3 % — SIGNIFICANT CHANGE UP (ref 42.2–75.2)
NITRITE UR-MCNC: NEGATIVE — SIGNIFICANT CHANGE UP
NRBC # BLD: 0 /100 WBCS — SIGNIFICANT CHANGE UP (ref 0–0)
PH UR: 6 — SIGNIFICANT CHANGE UP (ref 5–8)
PLATELET # BLD AUTO: 290 K/UL — SIGNIFICANT CHANGE UP (ref 130–400)
POTASSIUM SERPL-MCNC: 4.4 MMOL/L — SIGNIFICANT CHANGE UP (ref 3.5–5)
POTASSIUM SERPL-SCNC: 4.4 MMOL/L — SIGNIFICANT CHANGE UP (ref 3.5–5)
PROT SERPL-MCNC: 7.2 G/DL — SIGNIFICANT CHANGE UP (ref 6–8)
PROT UR-MCNC: NEGATIVE — SIGNIFICANT CHANGE UP
RBC # BLD: 4.34 M/UL — SIGNIFICANT CHANGE UP (ref 4.2–5.4)
RBC # FLD: 13.1 % — SIGNIFICANT CHANGE UP (ref 11.5–14.5)
RBC CASTS # UR COMP ASSIST: 3 /HPF — SIGNIFICANT CHANGE UP (ref 0–4)
SODIUM SERPL-SCNC: 136 MMOL/L — SIGNIFICANT CHANGE UP (ref 135–146)
SP GR SPEC: 1.01 — SIGNIFICANT CHANGE UP (ref 1.01–1.02)
UROBILINOGEN FLD QL: SIGNIFICANT CHANGE UP
WBC # BLD: 7.84 K/UL — SIGNIFICANT CHANGE UP (ref 4.8–10.8)
WBC # FLD AUTO: 7.84 K/UL — SIGNIFICANT CHANGE UP (ref 4.8–10.8)
WBC UR QL: 1 /HPF — SIGNIFICANT CHANGE UP (ref 0–5)

## 2020-08-07 PROCEDURE — 74181 MRI ABDOMEN W/O CONTRAST: CPT | Mod: 26

## 2020-08-07 PROCEDURE — 76830 TRANSVAGINAL US NON-OB: CPT | Mod: 26

## 2020-08-07 PROCEDURE — 99285 EMERGENCY DEPT VISIT HI MDM: CPT

## 2020-08-07 PROCEDURE — 99213 OFFICE O/P EST LOW 20 MIN: CPT

## 2020-08-07 PROCEDURE — 72195 MRI PELVIS W/O DYE: CPT | Mod: 26

## 2020-08-07 RX ORDER — SODIUM CHLORIDE 9 MG/ML
1000 INJECTION INTRAMUSCULAR; INTRAVENOUS; SUBCUTANEOUS ONCE
Refills: 0 | Status: COMPLETED | OUTPATIENT
Start: 2020-08-07 | End: 2020-08-07

## 2020-08-07 RX ORDER — ACETAMINOPHEN 500 MG
650 TABLET ORAL ONCE
Refills: 0 | Status: COMPLETED | OUTPATIENT
Start: 2020-08-07 | End: 2020-08-07

## 2020-08-07 RX ADMIN — SODIUM CHLORIDE 1000 MILLILITER(S): 9 INJECTION INTRAMUSCULAR; INTRAVENOUS; SUBCUTANEOUS at 07:04

## 2020-08-07 NOTE — ED PROVIDER NOTE - PATIENT PORTAL LINK FT
You can access the FollowMyHealth Patient Portal offered by [facility] by registering at the following website: [email or web address]. By joining [employer]’s FollowMyHealth portal, you will also be able to view your health information using other applications (apps) compatible with our system.

## 2020-08-07 NOTE — ED PROVIDER NOTE - PROGRESS NOTE DETAILS
Signed out to Dr. Gallo pending labs, , US. No free fluid on POCUS. -DC Pt s/o to Dr. Payne to follow up labs, imaging, reassess and dispo. AG: EVA consulted will see pt

## 2020-08-07 NOTE — ED PROVIDER NOTE - CARE PROVIDERS DIRECT ADDRESSES
,DirectAddress_Unknown,[email or web address]

## 2020-08-07 NOTE — CONSULT NOTE ADULT - ATTENDING COMMENTS
Agree with resident note. Patient with early pregnancy, r/o ectopic. Will trend beta hcg. Precautions discussed. Follow up outpatient.

## 2020-08-07 NOTE — ED PROVIDER NOTE - NSFOLLOWUPINSTRUCTIONS_ED_ALL_ED_FT
Uterine Fibroids    Uterine fibroids are tissue masses (tumors) that can develop in the womb (uterus). They are also called leiomyomas. This type of tumor is not cancerous (benign) and does not spread to other parts of the body outside of the pelvic area, which is between the hip bones. Occasionally, fibroids may develop in the fallopian tubes, in the cervix, or on the support structures (ligaments) that surround the uterus.    You can have one or many fibroids. Fibroids can vary in size, weight, and where they grow in the uterus. Some can become quite large. Most fibroids do not require medical treatment.     CAUSES  A fibroid can develop when a single uterine cell keeps growing (replicating). Most cells in the human body have a control mechanism that keeps them from replicating without control.    SIGNS AND SYMPTOMS  Symptoms may include:     Heavy bleeding during your period.  Bleeding or spotting between periods.  Pelvic pain and pressure.  Bladder problems, such as needing to urinate more often (urinary frequency) or urgently.  Inability to reproduce offspring (infertility).  Miscarriages.    DIAGNOSIS  Uterine fibroids are diagnosed through a physical exam. Your health care provider may feel the lumpy tumors during a pelvic exam. Ultrasonography and an MRI may be done to determine the size, location, and number of fibroids.    TREATMENT  Treatment may include:    Watchful waiting. This involves getting the fibroid checked by your health care provider to see if it grows or shrinks. Follow your health care provider's recommendations for how often to have this checked.  Hormone medicines. These can be taken by mouth or given through an intrauterine device (IUD).  Surgery.  Removing the fibroids (myomectomy) or the uterus (hysterectomy).  Removing blood supply to the fibroids (uterine artery embolization).    If fibroids interfere with your fertility and you want to become pregnant, your health care provider may recommend having the fibroids removed.     HOME CARE INSTRUCTIONS  Keep all follow-up visits as directed by your health care provider. This is important.  Take over-the-counter and prescription medicines only as told by your health care provider.  If you were prescribed a hormone treatment, take the hormone medicines exactly as directed.  Ask your health care provider about taking iron pills and increasing the amount of dark green, leafy vegetables in your diet. These actions can help to boost your blood iron levels, which may be affected by heavy menstrual bleeding.  Pay close attention to your period and tell your health care provider about any changes, such as:  Increased blood flow that requires you to use more pads or tampons than usual per month.  A change in the number of days that your period lasts per month.  A change in symptoms that are associated with your period, such as abdominal cramping or back pain.    SEEK MEDICAL CARE IF:  You have pelvic pain, back pain, or abdominal cramps that cannot be controlled with medicines.  You have an increase in bleeding between and during periods.  You soak tampons or pads in a half hour or less.  You feel lightheaded, extra tired, or weak.    SEEK IMMEDIATE MEDICAL CARE IF:  You faint.  You have a sudden increase in pelvic pain.    ADDITIONAL NOTES AND INSTRUCTIONS    Please follow up with your Primary MD in 24-48 hr.  Seek immediate medical care for any new/worsening signs or symptoms.

## 2020-08-07 NOTE — ED PROVIDER NOTE - NS ED ROS FT
Constitutional: No fevers.   Eyes:  No visual changes, eye pain or discharge.  ENMT:  No sore throat or runny nose.  Cardiac:  No chest pain, SOB or edema.   Respiratory:  No cough or respiratory distress. No hemoptysis. No history of asthma or RAD.  GI:  +abdominal pain.   :  No dysuria, frequency or burning. +preg test  MS:  No myalgia, muscle weakness, joint pain or back pain.  Neuro:  No headache or weakness.  No LOC.  Skin:  No skin rash.   Endocrine: No history of thyroid disease or diabetes.

## 2020-08-07 NOTE — ED ADULT NURSE NOTE - NSIMPLEMENTINTERV_GEN_ALL_ED
Implemented All Universal Safety Interventions:  [place] to call system. Call bell, personal items and telephone within reach. Instruct patient to call for assistance. Room bathroom lighting operational. Non-slip footwear when patient is off stretcher. Physically safe environment: no spills, clutter or unnecessary equipment. Stretcher in lowest position, wheels locked, appropriate side rails in place.

## 2020-08-07 NOTE — ED PROVIDER NOTE - CLINICAL SUMMARY MEDICAL DECISION MAKING FREE TEXT BOX
ATTENDING NOTE: I personally evaluated the patient. I reviewed the Resident’s note (as assigned above), and agree with the findings and plan except as documented in my note.   28 y/o F with PMH of fibroid uterus p/w sharp, diffuse abdominal pain since 5AM. Pt was scheduled for MRI of abdomen 2 days ago but tested (+) for pregnancy, now presents for pain. On exam pt with pain in lower abdomen but overall diffuse, no pelvic complaints. Concern for torsion, obtained US which showed gestational sac and [id number] multi-fibroid uterus. Labs show elevated ECG of 2400 consistent with dates. Concern for intraabdominal pathology, in setting of pregnancy obtained MRI of abdomen showing a nl appendix and fibroid uterus. Pain likely from fibroid pathology. OB at bedside will f/u with pt [facility] where pt will go for repeat US  and f/u beta in 2 days. Pt currently feels better after fluid and Tylenol, and is resting comfortable. Will DC home with strict return precautions for persistent abdominal pain. ATTENDING NOTE: I personally evaluated the patient. I reviewed the Resident’s note (as assigned above), and agree with the findings and plan except as documented in my note.   28 y/o F with PMH of fibroid uterus p/w sharp, diffuse abdominal pain since 5AM. Pt was scheduled for MRI of abdomen 2 days ago but tested (+) for pregnancy, now presents for pain. On exam pt with pain in lower abdomen but overall diffuse, no pelvic complaints. Concern for torsion, obtained US which showed gestational sac and [id number] multi-fibroid uterus. Labs show elevated ECG of 2400 consistent with dates. Concern for intraabdominal pathology, in setting of pregnancy obtained MRI of abdomen showing a nl appendix and fibroid uterus. Pain likely from fibroid pathology. OB at bedside will f/u with pt at [facility] clinic where pt will go for repeat US  and f/u bHCG in 2 days. Pt currently feels better after fluid and Tylenol, and is resting comfortable. Will DC home with strict return precautions for persistent abdominal pain.

## 2020-08-07 NOTE — ED PROVIDER NOTE - OBJECTIVE STATEMENT
Patient is a 30 yo F w/ hx of fibroid uterus p/w abdominal pain. Patient had positive urine preg 2 days prior; had light period July 3rd but previous to that last menstrual period was end of May. Patient has had intermittent episodes of lower abdominal pain, sharp, severe, non-radiating lasting approx 1 hour x 2 days, last episode this morning waking patient up from her sleep; currently asymptomatic. No fevers, no dysuria, no N/V/D. Patient denies vaginal bleeding or discharge. No previous pregnancies. No current OB to follow up with.

## 2020-08-07 NOTE — CONSULT NOTE ADULT - SUBJECTIVE AND OBJECTIVE BOX
Chief Complaint: abdominal pain    HPI: 28yo  @5w2d by LMP  presenting for intermittent lower abdominal pain, starting yesterday morning and present intermittently yesterday PM and this AM.  Currently denies any abdominal pain.  Denies VB, vaginal discharge, dysuria, fevers, chills.  Pain is sharp, nonradiating, did not attempt pain relief with OTC pain medication.  Denies HA, CP, SOB, N/V, diarrhea, constipation.  Last PO intake this AM, last BM this AM.  Does not follow with anyone for gyn care. Planned and desired pregnancy.      Ob/Gyn History:                   LMP                  Cycle Length q28d   Obhx: primigravid  Denies history of ovarian cysts, uterine fibroids, abnormal paps, or STIs  Last Pap Smear -   Last Mammogram -   Last Colonoscopy -      Denies the following: constitutional symptoms, visual symptoms, cardiovascular symptoms, respiratory symptoms, GI symptoms, musculoskeletal symptoms, skin symptoms, neurologic symptoms, hematologic symptoms, allergic symptoms, psychiatric symptoms  Except any pertinent positives listed.     Home Medications: None      Allergies    No Known Allergies      PAST MEDICAL & SURGICAL HISTORY:  No pertinent past medical history  No significant past surgical history      FAMILY HISTORY:   No significant family history      SOCIAL HISTORY: Denies cigarette use, alcohol use, or illicit drug use    Vital Signs Last 24 Hrs  T(F): 98.4 (07 Aug 2020 09:33), Max: 98.6 (07 Aug 2020 06:26)  HR: 85 (07 Aug 2020 09:33) (78 - 85)  BP: 113/68 (07 Aug 2020 09:33) (113/68 - 130/80)  RR: 16 (07 Aug 2020 09:33) (16 - 18)  Height (cm): 162.56 (20 @ 06:26)  Weight (kg): 74.8 (20 @ 06:26)  BMI (kg/m2): 28.3 (20 @ 06:26)  BSA (m2): 1.8 (20 @ 06:26)      General Appearance - AAOx3, NAD  Heart - S1S2 regular rate and rhythm  Lung - CTA Bilaterally  Abdomen - Soft, nontender, nondistended, no rebound, no rigidity, no guarding, bowel sounds present    GYN/Pelvis:    Labia Majora - Normal  Labia Minora - Normal  Clitoris - Normal  Urethra - Normal  Vagina - Normal  Cervix - Normal, closed, no bleeding, no discharge    Uterus:  Size - Normal, 7wk sized, anteverted  Tenderness - None  Mass - None  Freely mobile    Adnexa:  Masses - None  Tenderness - None      Meds:   acetaminophen   Tablet .. 650 milliGRAM(s) Oral once  sodium chloride 0.9% Bolus 1000 milliLiter(s) IV Bolus once    Height (cm): 162.56 (20 @ 06:26)  Weight (kg): 74.8 (20 @ 06:26)  BMI (kg/m2): 28.3 (20 @ 06:26)  BSA (m2): 1.8 (20 @ 06:26)    LABS:                        12.5   7.84  )-----------( 290      ( 07 Aug 2020 07:32 )             37.3     HCG Quantitative, Serum: 2422.0 mIU/mL (20 @ 06:20)          136  |  103  |  16  ----------------------------<  98  4.4   |  21  |  0.6<L>    Ca    9.2      07 Aug 2020 06:20    TPro  7.2  /  Alb  4.3  /  TBili  0.3  /  DBili  x   /  AST  22  /  ALT  14  /  AlkPhos  49  08-07      Urinalysis Basic - ( 07 Aug 2020 06:48 )    Color: Light Yellow / Appearance: Clear / S.013 / pH: x  Gluc: x / Ketone: Negative  / Bili: Negative / Urobili: <2 mg/dL   Blood: x / Protein: Negative / Nitrite: Negative   Leuk Esterase: Negative / RBC: 3 /HPF / WBC 1 /HPF   Sq Epi: x / Non Sq Epi: 1 /HPF / Bacteria: Negative      T&S pending    RADIOLOGY & ADDITIONAL STUDIES:    EXAM:  US TRANSVAGINAL            PROCEDURE DATE:  2020            INTERPRETATION:  CLINICAL INFORMATION: Abdominal pain. Pregnant, first trimester.    LMP: 2020. Currently pregnant, first trimester.    COMPARISON: Ultrasound pelvis 2020.    TECHNIQUE:  Transabdominal and transvaginal ultrasound of the pelvis was performed, including Doppler.    FINDINGS:    Uterus: 8.8 x 6.4 x 4.8 cm. Multiple intramural and serosal fibroids, with the largest measuring 6.7 x 4.6 x 5.9 cm.    Endometrium: Gestational sac is present with the MSD measuring 6 mm and MSD age corresponding to early IUP.    Right ovary: 2.9 x 2.6 x 1.7 cm. .. Vascular flow is seen to the ovary.  Left ovary: 2.8 x 1.9 x 1.5 cm. Within normal limits. Vascular flow isseen to the ovary.    Fluid: Mild free pelvic fluid present, likely physiologic.    IMPRESSION:    Gestational sac present with the MSD length/age corresponding to early intrauterine pregnancy.    Multi fibroid uterus with the largest measuring 6.7 x4.6 x 5.9 cm.

## 2020-08-07 NOTE — ED PROVIDER NOTE - PHYSICAL EXAMINATION
CONSTITUTIONAL: Well-developed; well-nourished; in no acute distress.   SKIN: warm, dry.  HEAD: Normocephalic; atraumatic.  EYES: PERRL, EOMI, no conjunctival erythema.  ENT: No nasal discharge; airway clear.  NECK: Supple; non tender.  CARD: S1, S2 normal; no murmurs, gallops, or rubs. Regular rate and rhythm.   RESP: No wheezes, rales or rhonchi.  ABD: soft nondistended, tender to palpation in the RLQ and suprapubic region without guarding or rebound.   EXT: Normal ROM.  No clubbing, cyanosis or edema.   NEURO: Alert, oriented, grossly unremarkable.  PSYCH: Cooperative, appropriate.

## 2020-08-07 NOTE — ED PROVIDER NOTE - CARE PROVIDER_API CALL
Willy Jane  OBSTETRICS AND GYNECOLOGY  [address] [place] [address]  Phone: [phone]  Fax: [phone]  Follow Up Time: 1-3 Days    Janee Murphy  GYNECOLOGIC ONCOLOGY  [address] [place] [address]  Phone: [phone]  Fax: [phone]  Follow Up Time: 1-3 Days

## 2020-08-07 NOTE — ED PROVIDER NOTE - PROVIDER TOKENS
PROVIDER:[TOKEN:[[id number]],FOLLOWUP:[1-3 Days]],PROVIDER:[TOKEN:[[id number]],FOLLOWUP:[1-3 Days]]

## 2020-08-07 NOTE — CONSULT NOTE ADULT - ASSESSMENT
30yo  @5w2d with LMP , with abdominal pain and gestational sac on TVUS, likely normal IUP r/o abnormal IUP vs. ectopic pregnancy, clinically and hemodynamically stable  -INCOMPLETE 30yo  @5w2d with LMP , with abdominal pain and gestational sac on TVUS, likely normal IUP r/o abnormal IUP vs. ectopic pregnancy, clinically and hemodynamically stable  -Discussed possibility of normal IUP vs. abnormal IUP vs. ectopic pregnancy.  Due to highly desired pregnancy and resolved pain, patient desires expectant management at this time  -repeat bhcg in 48hrs (script given)  -bleeding and ectopic precautions discussed    Dr. Srinivasan and Dr. Carr aware. 30yo  @5w2d with LMP , with abdominal pain and gestational sac on TVUS, likely normal IUP r/o abnormal IUP, clinically and hemodynamically stable  -Discussed possibility of normal IUP vs. abnormal IUP vs. ectopic pregnancy.  Due to highly desired pregnancy and resolved pain, patient desires expectant management at this time  -repeat bhcg in 48hrs (script given)  -bleeding and ectopic precautions discussed    Dr. Srinivasan and Dr. Carr aware.

## 2020-08-12 DIAGNOSIS — O36.80X0 PREGNANCY WITH INCONCLUSIVE FETAL VIABILITY, NOT APPLICABLE OR UNSPECIFIED: ICD-10-CM

## 2020-08-12 DIAGNOSIS — Z00.00 ENCOUNTER FOR GENERAL ADULT MEDICAL EXAMINATION W/OUT ABNORMAL FINDINGS: ICD-10-CM

## 2020-08-17 ENCOUNTER — APPOINTMENT (OUTPATIENT)
Dept: ANTEPARTUM | Facility: CLINIC | Age: 30
End: 2020-08-17
Payer: COMMERCIAL

## 2020-08-17 ENCOUNTER — OUTPATIENT (OUTPATIENT)
Dept: OUTPATIENT SERVICES | Facility: HOSPITAL | Age: 30
LOS: 1 days | Discharge: HOME | End: 2020-08-17

## 2020-08-17 ENCOUNTER — ASOB RESULT (OUTPATIENT)
Age: 30
End: 2020-08-17

## 2020-08-17 PROCEDURE — 76801 OB US < 14 WKS SINGLE FETUS: CPT | Mod: 26

## 2020-08-17 NOTE — CHART NOTE - NSCHARTNOTEFT_GEN_A_CORE
Mrs. Harley presented to ED     HPI: 30yo  @5w2d by LMP  presented for intermittent lower abdominal pain, started morning the day prior and present intermittently into PM and that AM. Currently had no abdominal pain.  Denied VB, vaginal discharge, dysuria, fevers, chills.  Did not follow with anyone for gyn care. Planned and desired pregnancy.    Labs:   7.84>12.5/37.3<290, 136/4.4/103/21/16/0.6<98, lactate 0.9, lipase 26, hcg 2422, A pos, UA neg   bhcg 14,024    Imagin/7 TVUS: Gestational sac present with the MSD length/age corresponding to early intrauterine pregnancy. Multi fibroid uterus with the largest measuring 6.7 x 4.6 x 5.9 cm.  MRI: 1. Normal appendix. 2. Fibroid uterus, with multiple subserosal, intramural, and submucosal fibroids catalogued above, including dominant pedunculated, subserosal fibroid measuring 7.4 cm extending from anterior uterine body, abutting urinary bladder. Anterior submucosal fibroid measuring 0.9 cm approximately 1.1 cm from gestational sac. 3. Intrauterine early 0.8 x 0.5 cm gestational sac at fundus.     SIUP w/ FHR    Ddx initially likely normal IUP vs. abnormal IUP, unlikely ectopic    Pt repeated bhcg with appropriate rise. Had sono  with intrauterine gestation and fetal heart rate. Diagnosis now intrauterine pregnancy. GYN will no longer continue to follow.    Dr. Zarina cast.

## 2020-09-22 ENCOUNTER — APPOINTMENT (OUTPATIENT)
Dept: ANTEPARTUM | Facility: CLINIC | Age: 30
End: 2020-09-22
Payer: COMMERCIAL

## 2020-09-22 ENCOUNTER — ASOB RESULT (OUTPATIENT)
Age: 30
End: 2020-09-22

## 2020-09-22 ENCOUNTER — OUTPATIENT (OUTPATIENT)
Dept: OUTPATIENT SERVICES | Facility: HOSPITAL | Age: 30
LOS: 1 days | Discharge: HOME | End: 2020-09-22

## 2020-09-22 PROCEDURE — 76813 OB US NUCHAL MEAS 1 GEST: CPT | Mod: 26

## 2020-09-23 DIAGNOSIS — Z36.82 ENCOUNTER FOR ANTENATAL SCREENING FOR NUCHAL TRANSLUCENCY: ICD-10-CM

## 2020-09-23 DIAGNOSIS — Z3A.10 10 WEEKS GESTATION OF PREGNANCY: ICD-10-CM

## 2020-09-23 DIAGNOSIS — O34.10 MATERNAL CARE FOR BENIGN TUMOR OF CORPUS UTERI, UNSPECIFIED TRIMESTER: ICD-10-CM

## 2020-10-15 ENCOUNTER — APPOINTMENT (OUTPATIENT)
Dept: NEUROLOGY | Facility: CLINIC | Age: 30
End: 2020-10-15
Payer: COMMERCIAL

## 2020-10-15 VITALS
BODY MASS INDEX: 25.76 KG/M2 | OXYGEN SATURATION: 99 % | DIASTOLIC BLOOD PRESSURE: 77 MMHG | SYSTOLIC BLOOD PRESSURE: 119 MMHG | HEIGHT: 62 IN | WEIGHT: 140 LBS | HEART RATE: 96 BPM | TEMPERATURE: 98 F

## 2020-10-15 DIAGNOSIS — M54.16 RADICULOPATHY, LUMBAR REGION: ICD-10-CM

## 2020-10-15 PROCEDURE — 99203 OFFICE O/P NEW LOW 30 MIN: CPT

## 2020-10-15 NOTE — PHYSICAL EXAM
[FreeTextEntry1] : Mental status: Awake, alert and oriented x3.  Recent and remote memory intact.  Naming, repetition and comprehension intact.  Attention/concentration intact.  No dysarthria, no aphasia.  Fund of knowledge appropriate.  \par Cranial nerves: Pupils equally round and reactive to light, visual fields full, no nystagmus, extraocular muscles intact, V1 through V3 intact bilaterally and symmetric, face symmetric, hearing intact to finger rub, palate elevation symmetric, tongue was midline.\par Motor:  MRC grading 5/5 b/l UE/LE.   strength 5/5.  Normal tone and bulk.  No abnormal movements. Negative SLR test.  \par Sensation: Intact to light touch, proprioception, and pinprick. \par Coordination: No dysmetria on finger-to-nose and heel-to-shin.  No dysdiadokinesia.\par Reflexes: 3+ in bilateral UE/LE, downgoing toes bilaterally. (-) Bronson.\par Gait: Narrow and steady. No ataxia.  Romberg negative\par \par

## 2020-10-15 NOTE — ASSESSMENT
[FreeTextEntry1] : OLIVIA GOODE is a 29 year old woman is here to be seen as a new patient for intermittent pain in the right gluteal region and sometimes in the right groin for the past year or so. She is currently 14 weeks pregnant but stated that her pain started  before the pregnancy. Exam is unremarkable except for symmetric 3+ hyperreflexia. Her symptoms or neurogenic claudication including pain after walking and or long time standing, I suspect that her pain would be due to lumbar radiculopathy or radicular pain. I will start her on PT for lumbar radiculopathy. We discussed further options including EMG and MRI of lumbar spine, but they prefer to do all the testings and medication after pregnancy.  \par \par RTC in 4 months

## 2020-10-15 NOTE — HISTORY OF PRESENT ILLNESS
[FreeTextEntry1] : OLIVIA GOODE is a 29 year old woman currently 14 weeks pregnant and uterine fibroid presents to neurology clinic as a new patient for intermittent pain in the right gluteal  area for the past year. She is accompanied to the clinic with her . She reports having the similar pain before the pregnancy which started to get worse recently. She currently has intermittent pain and cramps in the right gluteal region. Pain is not stabbing or electric shock. It usually starts after long time sitting after she stands up or after long time standing. The pain usually lasts for 15 minutes and goes away. She denies any numbness, paresthesia or incontinence. She once noted radiating pain to the right groin but denies radiation down to her knee or toes.

## 2020-11-09 ENCOUNTER — OUTPATIENT (OUTPATIENT)
Dept: OUTPATIENT SERVICES | Facility: HOSPITAL | Age: 30
LOS: 1 days | Discharge: HOME | End: 2020-11-09

## 2020-11-09 VITALS — SYSTOLIC BLOOD PRESSURE: 122 MMHG | HEART RATE: 102 BPM | DIASTOLIC BLOOD PRESSURE: 70 MMHG

## 2020-11-09 VITALS
SYSTOLIC BLOOD PRESSURE: 122 MMHG | DIASTOLIC BLOOD PRESSURE: 70 MMHG | HEART RATE: 102 BPM | TEMPERATURE: 99 F | RESPIRATION RATE: 20 BRPM

## 2020-11-09 LAB
ALBUMIN SERPL ELPH-MCNC: 3.8 G/DL — SIGNIFICANT CHANGE UP (ref 3.5–5.2)
ALP SERPL-CCNC: 52 U/L — SIGNIFICANT CHANGE UP (ref 30–115)
ALT FLD-CCNC: 24 U/L — SIGNIFICANT CHANGE UP (ref 0–41)
ANION GAP SERPL CALC-SCNC: 11 MMOL/L — SIGNIFICANT CHANGE UP (ref 7–14)
APPEARANCE UR: CLEAR — SIGNIFICANT CHANGE UP
AST SERPL-CCNC: 15 U/L — SIGNIFICANT CHANGE UP (ref 0–41)
BILIRUB SERPL-MCNC: <0.2 MG/DL — SIGNIFICANT CHANGE UP (ref 0.2–1.2)
BILIRUB UR-MCNC: NEGATIVE — SIGNIFICANT CHANGE UP
BUN SERPL-MCNC: 5 MG/DL — LOW (ref 10–20)
CALCIUM SERPL-MCNC: 9.2 MG/DL — SIGNIFICANT CHANGE UP (ref 8.5–10.1)
CHLORIDE SERPL-SCNC: 105 MMOL/L — SIGNIFICANT CHANGE UP (ref 98–110)
CO2 SERPL-SCNC: 22 MMOL/L — SIGNIFICANT CHANGE UP (ref 17–32)
COLOR SPEC: SIGNIFICANT CHANGE UP
CREAT SERPL-MCNC: <0.5 MG/DL — LOW (ref 0.7–1.5)
DIFF PNL FLD: SIGNIFICANT CHANGE UP
GLUCOSE SERPL-MCNC: 95 MG/DL — SIGNIFICANT CHANGE UP (ref 70–99)
GLUCOSE UR QL: NEGATIVE — SIGNIFICANT CHANGE UP
HCT VFR BLD CALC: 35.5 % — LOW (ref 37–47)
HGB BLD-MCNC: 12 G/DL — SIGNIFICANT CHANGE UP (ref 12–16)
KETONES UR-MCNC: NEGATIVE — SIGNIFICANT CHANGE UP
LEUKOCYTE ESTERASE UR-ACNC: NEGATIVE — SIGNIFICANT CHANGE UP
MCHC RBC-ENTMCNC: 28.4 PG — SIGNIFICANT CHANGE UP (ref 27–31)
MCHC RBC-ENTMCNC: 33.8 G/DL — SIGNIFICANT CHANGE UP (ref 32–37)
MCV RBC AUTO: 83.9 FL — SIGNIFICANT CHANGE UP (ref 81–99)
NITRITE UR-MCNC: NEGATIVE — SIGNIFICANT CHANGE UP
NRBC # BLD: 0 /100 WBCS — SIGNIFICANT CHANGE UP (ref 0–0)
PH UR: 7.5 — SIGNIFICANT CHANGE UP (ref 5–8)
PLATELET # BLD AUTO: 270 K/UL — SIGNIFICANT CHANGE UP (ref 130–400)
POTASSIUM SERPL-MCNC: 4.1 MMOL/L — SIGNIFICANT CHANGE UP (ref 3.5–5)
POTASSIUM SERPL-SCNC: 4.1 MMOL/L — SIGNIFICANT CHANGE UP (ref 3.5–5)
PROT SERPL-MCNC: 6.6 G/DL — SIGNIFICANT CHANGE UP (ref 6–8)
PROT UR-MCNC: NEGATIVE — SIGNIFICANT CHANGE UP
RBC # BLD: 4.23 M/UL — SIGNIFICANT CHANGE UP (ref 4.2–5.4)
RBC # FLD: 13.1 % — SIGNIFICANT CHANGE UP (ref 11.5–14.5)
SODIUM SERPL-SCNC: 138 MMOL/L — SIGNIFICANT CHANGE UP (ref 135–146)
SP GR SPEC: 1.01 — SIGNIFICANT CHANGE UP (ref 1.01–1.03)
UROBILINOGEN FLD QL: SIGNIFICANT CHANGE UP
WBC # BLD: 9.05 K/UL — SIGNIFICANT CHANGE UP (ref 4.8–10.8)
WBC # FLD AUTO: 9.05 K/UL — SIGNIFICANT CHANGE UP (ref 4.8–10.8)

## 2020-11-09 RX ORDER — INDOMETHACIN 50 MG
25 CAPSULE ORAL ONCE
Refills: 0 | Status: COMPLETED | OUTPATIENT
Start: 2020-11-09 | End: 2020-11-09

## 2020-11-09 RX ORDER — INDOMETHACIN 50 MG
1 CAPSULE ORAL
Qty: 12 | Refills: 0
Start: 2020-11-09 | End: 2020-11-11

## 2020-11-09 RX ADMIN — Medication 25 MILLIGRAM(S): at 08:34

## 2020-11-09 NOTE — OB PROVIDER TRIAGE NOTE - NSHPPHYSICALEXAM_GEN_ALL_CORE
T(C): 37.1 (11-09-20 @ 06:40), Max: 37.1 (11-09-20 @ 06:40)  HR: 102 (11-09-20 @ 06:40) (102 - 102)  BP: 122/70 (11-09-20 @ 06:40) (122/70 - 122/70)  RR: 20 (11-09-20 @ 06:40) (20 - 20)    Abd: gravid, soft, +suprapubic and RLQ/ LLQ tenderness, +fundal tenderness  Speculum exam: +yellow discharge, neg VB  VE: Cervix closed, long/ anterior  Ctx: no ctx noted  FHR: 150 by sono

## 2020-11-09 NOTE — OB PROVIDER TRIAGE NOTE - NSOBPROVIDERNOTE_OBGYN_ALL_OB_FT
30y.o.  @ 18wks w/ probable degenerating fibroid  - Will send UA, CBC, Urine cx  - Indomethacin 25mg PO x 1 now   - Dr. Henderson/ Myranda cast

## 2020-11-09 NOTE — OB PROVIDER TRIAGE NOTE - HISTORY OF PRESENT ILLNESS
Pt is a 30y.o.  @ 18wks presents c/o lower abdominal pain x 2 days which has worsened. Pt reports pain is intermittant, 5 episodes in an hour, lasting "minutes". Pt reports pain is sharp and moves to RLQ and LLQ. Pt denies N/V/D/C, denies fever or chills, denies VB. LOF or discharge. Pt reports last BM was yesterday AM. Pt has h/o Uterine fibroids, Per pt there is a 7cm pedunculated fundal fibroid, along with several smaller fibroids.

## 2020-11-09 NOTE — PROGRESS NOTE ADULT - SUBJECTIVE AND OBJECTIVE BOX
Patient's pain improved s/p initial dose of indocin. All labs wnl. Will dc with 25mg indocin q6hrs x72hrs. Encourage PO hydration, Tylenol PRN pain,  labor precautions. Dc to home with follow up in office  afternoon.    Dr. Whitmore aware.

## 2020-11-10 LAB
CULTURE RESULTS: SIGNIFICANT CHANGE UP
SPECIMEN SOURCE: SIGNIFICANT CHANGE UP

## 2020-11-18 ENCOUNTER — ASOB RESULT (OUTPATIENT)
Age: 30
End: 2020-11-18

## 2020-11-18 ENCOUNTER — OUTPATIENT (OUTPATIENT)
Dept: OUTPATIENT SERVICES | Facility: HOSPITAL | Age: 30
LOS: 1 days | Discharge: HOME | End: 2020-11-18

## 2020-11-18 ENCOUNTER — APPOINTMENT (OUTPATIENT)
Dept: ANTEPARTUM | Facility: CLINIC | Age: 30
End: 2020-11-18
Payer: COMMERCIAL

## 2020-11-18 DIAGNOSIS — O35.8XX1 MATERNAL CARE FOR OTHER (SUSPECTED) FETAL ABNORMALITY AND DAMAGE, FETUS 1: ICD-10-CM

## 2020-11-18 PROCEDURE — 76811 OB US DETAILED SNGL FETUS: CPT | Mod: 26

## 2020-11-24 DIAGNOSIS — O35.9XX0 MATERNAL CARE FOR (SUSPECTED) FETAL ABNORMALITY AND DAMAGE, UNSPECIFIED, NOT APPLICABLE OR UNSPECIFIED: ICD-10-CM

## 2020-11-24 DIAGNOSIS — Z36.3 ENCOUNTER FOR ANTENATAL SCREENING FOR MALFORMATIONS: ICD-10-CM

## 2020-11-24 DIAGNOSIS — O35.8XX1 MATERNAL CARE FOR OTHER (SUSPECTED) FETAL ABNORMALITY AND DAMAGE, FETUS 1: ICD-10-CM

## 2020-11-24 DIAGNOSIS — Z36.89 ENCOUNTER FOR OTHER SPECIFIED ANTENATAL SCREENING: ICD-10-CM

## 2020-11-24 DIAGNOSIS — Z3A.20 20 WEEKS GESTATION OF PREGNANCY: ICD-10-CM

## 2020-12-16 ENCOUNTER — ASOB RESULT (OUTPATIENT)
Age: 30
End: 2020-12-16

## 2020-12-16 ENCOUNTER — OUTPATIENT (OUTPATIENT)
Dept: OUTPATIENT SERVICES | Facility: HOSPITAL | Age: 30
LOS: 1 days | Discharge: HOME | End: 2020-12-16

## 2020-12-16 ENCOUNTER — APPOINTMENT (OUTPATIENT)
Dept: ANTEPARTUM | Facility: CLINIC | Age: 30
End: 2020-12-16
Payer: COMMERCIAL

## 2020-12-16 PROCEDURE — 76816 OB US FOLLOW-UP PER FETUS: CPT | Mod: 26

## 2020-12-23 DIAGNOSIS — Z3A.24 24 WEEKS GESTATION OF PREGNANCY: ICD-10-CM

## 2020-12-23 DIAGNOSIS — O26.849 UTERINE SIZE-DATE DISCREPANCY, UNSPECIFIED TRIMESTER: ICD-10-CM

## 2020-12-23 DIAGNOSIS — Z36.89 ENCOUNTER FOR OTHER SPECIFIED ANTENATAL SCREENING: ICD-10-CM

## 2021-02-04 ENCOUNTER — ASOB RESULT (OUTPATIENT)
Age: 31
End: 2021-02-04

## 2021-02-04 ENCOUNTER — APPOINTMENT (OUTPATIENT)
Dept: ANTEPARTUM | Facility: CLINIC | Age: 31
End: 2021-02-04
Payer: COMMERCIAL

## 2021-02-04 ENCOUNTER — OUTPATIENT (OUTPATIENT)
Dept: OUTPATIENT SERVICES | Facility: HOSPITAL | Age: 31
LOS: 1 days | Discharge: HOME | End: 2021-02-04

## 2021-02-04 PROCEDURE — 76816 OB US FOLLOW-UP PER FETUS: CPT | Mod: 26

## 2021-02-04 PROCEDURE — 76819 FETAL BIOPHYS PROFIL W/O NST: CPT | Mod: 26

## 2021-02-11 ENCOUNTER — OUTPATIENT (OUTPATIENT)
Dept: OUTPATIENT SERVICES | Facility: HOSPITAL | Age: 31
LOS: 1 days | Discharge: HOME | End: 2021-02-11

## 2021-02-11 ENCOUNTER — ASOB RESULT (OUTPATIENT)
Age: 31
End: 2021-02-11

## 2021-02-11 ENCOUNTER — TRANSCRIPTION ENCOUNTER (OUTPATIENT)
Age: 31
End: 2021-02-11

## 2021-02-11 ENCOUNTER — APPOINTMENT (OUTPATIENT)
Dept: ANTEPARTUM | Facility: CLINIC | Age: 31
End: 2021-02-11
Payer: COMMERCIAL

## 2021-02-11 DIAGNOSIS — O40.9XX0 POLYHYDRAMNIOS, UNSPECIFIED TRIMESTER, NOT APPLICABLE OR UNSPECIFIED: ICD-10-CM

## 2021-02-11 DIAGNOSIS — Z3A.31 31 WEEKS GESTATION OF PREGNANCY: ICD-10-CM

## 2021-02-11 DIAGNOSIS — O34.10 MATERNAL CARE FOR BENIGN TUMOR OF CORPUS UTERI, UNSPECIFIED TRIMESTER: ICD-10-CM

## 2021-02-11 PROCEDURE — 76819 FETAL BIOPHYS PROFIL W/O NST: CPT | Mod: 26

## 2021-02-16 ENCOUNTER — OUTPATIENT (OUTPATIENT)
Dept: OUTPATIENT SERVICES | Facility: HOSPITAL | Age: 31
LOS: 1 days | Discharge: HOME | End: 2021-02-16

## 2021-02-16 VITALS
DIASTOLIC BLOOD PRESSURE: 78 MMHG | RESPIRATION RATE: 20 BRPM | HEART RATE: 119 BPM | SYSTOLIC BLOOD PRESSURE: 132 MMHG | TEMPERATURE: 99 F

## 2021-02-16 VITALS — DIASTOLIC BLOOD PRESSURE: 66 MMHG | SYSTOLIC BLOOD PRESSURE: 119 MMHG | HEART RATE: 100 BPM

## 2021-02-16 LAB
APPEARANCE UR: CLEAR — SIGNIFICANT CHANGE UP
BACTERIA # UR AUTO: ABNORMAL
BILIRUB UR-MCNC: NEGATIVE — SIGNIFICANT CHANGE UP
COLOR SPEC: YELLOW — SIGNIFICANT CHANGE UP
DIFF PNL FLD: NEGATIVE — SIGNIFICANT CHANGE UP
EPI CELLS # UR: 6 /HPF — HIGH (ref 0–5)
GLUCOSE BLDC GLUCOMTR-MCNC: 100 MG/DL — HIGH (ref 70–99)
GLUCOSE UR QL: NEGATIVE — SIGNIFICANT CHANGE UP
HYALINE CASTS # UR AUTO: 5 /LPF — SIGNIFICANT CHANGE UP (ref 0–7)
KETONES UR-MCNC: SIGNIFICANT CHANGE UP
LEUKOCYTE ESTERASE UR-ACNC: NEGATIVE — SIGNIFICANT CHANGE UP
NITRITE UR-MCNC: NEGATIVE — SIGNIFICANT CHANGE UP
PH UR: 6 — SIGNIFICANT CHANGE UP (ref 5–8)
PROT UR-MCNC: ABNORMAL
RBC CASTS # UR COMP ASSIST: 5 /HPF — HIGH (ref 0–4)
SP GR SPEC: 1.03 — HIGH (ref 1.01–1.03)
UROBILINOGEN FLD QL: SIGNIFICANT CHANGE UP
WBC UR QL: 4 /HPF — SIGNIFICANT CHANGE UP (ref 0–5)

## 2021-02-16 NOTE — OB PROVIDER TRIAGE NOTE - HISTORY OF PRESENT ILLNESS
31yo  at 32w2d, DEANNE 21 dated by LMP 20, fibroid uterus, GDMA2, Polyhydramnios, with irregular cramping and LOF. She reports LOF since  and reports abnormal consistency of discharge. She reports overall decreased fetal movement compared to previous movement. She denies vaginal bleeding.     She has gestational diabetes, on Humulin 6u at night, elevated fingersticks in the AM- 120s, and postprandials 110s-170.  31yo  at 32w2d, DEANNE 21 dated by LMP 20, fibroid uterus, GDMA2,  Polyhydramnios, with irregular cramping and LOF. She reports overall decreased fetal movement compared to previous fetal movement. She has new onset Polyhydramnios as of february, followed by MFM. She has gestational diabetes, on Humulin 6u at night, elevated fingersticks in the AM- 120s, and postprandials 110s-170. She denies vaginal bleeding. Known history of fibroid uterus, with occasional abdominal pain and cramping.   Denies HA, vision changes, CP, SOB, RUQ pain/ Epigastric pain, N/V, LE pain/ swelling, diarrhea, pain/difficulty with urination, fevers, chills.  She reports LOF since  and reports abnormal consistency of discharge. Last intercourse , Last bowel movement today, normal, soft logs.    29yo  at 32w2d, DEANNE 21 dated by LMP 20, fibroid uterus, GDMA2,  Polyhydramnios, with irregular cramping and LOF. She reports overall decreased fetal movement compared to previous fetal movement. She has new onset Polyhydramnios as of february, followed by MFM. She has gestational diabetes, on Humulin 6u at night, elevated fingersticks in the AM- 120s, and postprandials 110s-170. She denies vaginal bleeding. Known history of fibroid uterus, with occasional abdominal pain and cramping, s/p indomethacin in early pregnancy.   Denies HA, vision changes, CP, SOB, RUQ pain/ Epigastric pain, N/V, LE pain/ swelling, diarrhea, pain/difficulty with urination, fevers, chills.  She reports LOF since  and reports abnormal consistency of discharge. Last intercourse , Last bowel movement today, normal, soft logs.

## 2021-02-16 NOTE — OB PROVIDER TRIAGE NOTE - NSHPLABSRESULTS_GEN_ALL_CORE
Apos Apos    @32w1d: Mild Polyhydramnios, 11.5cm, BPP 8/8, anterior palcenta, fibroid uterus, marginal cord insertion   Anterior subserosal myoma X2  #1: 10x8cm subserosal ; #2: 4x4cm  - subserosal   @31w1d: 1814g, 4lb 56%  @24w0d: 667g, 1lb8oz, 49%      Urinalysis Basic - ( 2021 22:59 )  Color: Yellow / Appearance: Clear / S.035 / pH: x  Gluc: x / Ketone: Trace  / Bili: Negative / Urobili: <2 mg/dL   Blood: x / Protein: 30 mg/dL / Nitrite: Negative   Leuk Esterase: Negative / RBC: 5 /HPF / WBC 4 /HPF   Sq Epi: x / Non Sq Epi: 6 /HPF / Bacteria: Few

## 2021-02-16 NOTE — OB PROVIDER TRIAGE NOTE - NSHPPHYSICALEXAM_GEN_ALL_CORE
T(C): 37 (02-16-21 @ 21:44), Max: 37 (02-16-21 @ 21:44)  HR: 100 (02-16-21 @ 22:55) (99 - 119)  BP: 119/66 (02-16-21 @ 22:55) (109/65 - 132/78)  RR: 20 (02-16-21 @ 21:44) (20 - 20)    Gen: A+OX3. NAD  Abd: Soft, Nontender. Gravid.  FHR: 120bpm /mod aga/accels+/ reactive NST  TOCO: none  SVE: 0/60/-3  TVUS T(C): 37 (02-16-21 @ 21:44), Max: 37 (02-16-21 @ 21:44)  HR: 100 (02-16-21 @ 22:55) (99 - 119)  BP: 119/66 (02-16-21 @ 22:55) (109/65 - 132/78)  RR: 20 (02-16-21 @ 21:44) (20 - 20)    Gen: A+OX3. NAD  Heart; S1S2, RRR  Lungs: CTAB  Abd: Soft, Nontender. Gravid.  FHR: 120bpm /mod aga/accels+/ reactive NST  TOCO: none  Speculum: no blood/pooling, cervix appears closed, ferning neg, nitrazine neg  SVE: 0/60/-3  TVUS: CL 3.10cm  BSS: breech, MVP 4.5cm, BPP 8/8, ant placenta T(C): 37 (02-16-21 @ 21:44), Max: 37 (02-16-21 @ 21:44)  HR: 100 (02-16-21 @ 22:55) (99 - 119)  BP: 119/66 (02-16-21 @ 22:55) (109/65 - 132/78)  RR: 20 (02-16-21 @ 21:44) (20 - 20)    Gen: A+OX3. NAD  Heart; S1S2, RRR  Lungs: CTAB  Abd: Soft, Nontender. Gravid.  FHR: 120bpm /mod aga/accels+/ reactive NST  TOCO: none  Speculum: no blood/pooling, cervix appears closed, ferning neg, nitrazine neg  SVE: 0/60/-3  TVUS: CL 3.10cm, no previa   BSS: Breech, MVP 9.5cm, BPP 10/10, ant placenta T(C): 37 (02-16-21 @ 21:44), Max: 37 (02-16-21 @ 21:44)  HR: 100 (02-16-21 @ 22:55) (99 - 119)  BP: 119/66 (02-16-21 @ 22:55) (109/65 - 132/78)  RR: 20 (02-16-21 @ 21:44) (20 - 20)    Gen: A+OX3. NAD  Heart; S1S2, RRR  Lungs: CTAB  Abd: Soft, fibroids tender to palpation. no CVA or suprapubic tenderness. Gravid.  FHR: 120bpm /mod aga/accels+/ reactive NST  TOCO: none  Speculum: no blood/pooling, cervix appears closed, ferning neg, nitrazine neg  SVE: 0/60/-3  TVUS: CL 3.10cm, no previa   BSS: Breech, MVP 9.5cm, BPP 10/10, ant placenta

## 2021-02-16 NOTE — OB PROVIDER TRIAGE NOTE - NSOBPROVIDERNOTE_OBGYN_ALL_OB_FT
31yo  @32w2d, GBS unknown, not in labor, BPP 10/10, reassuring maternal and fetal status    -PO hydration encouraged  -FKC encouraged  -f/u with OB during next appointment    Dr. Gloria cast 31yo  @32w2d, gestational diabetes, polyhydramnios, fibroid uterus, with abdominal cramping, no signs of  labor at this time, physiologic discharge, not in labor, BPP 10/10, with reassuring maternal and fetal status.   -f/u urine culture   -f/u vaginitis culture  -f/u GBS  -PO hydration encouraged  -FKC encouraged  -f/u high risk on  for diabetic control    Dr. Castro aware

## 2021-02-17 DIAGNOSIS — O43.192 OTHER MALFORMATION OF PLACENTA, SECOND TRIMESTER: ICD-10-CM

## 2021-02-17 DIAGNOSIS — O34.10 MATERNAL CARE FOR BENIGN TUMOR OF CORPUS UTERI, UNSPECIFIED TRIMESTER: ICD-10-CM

## 2021-02-17 DIAGNOSIS — Z3A.32 32 WEEKS GESTATION OF PREGNANCY: ICD-10-CM

## 2021-02-17 DIAGNOSIS — O40.9XX0 POLYHYDRAMNIOS, UNSPECIFIED TRIMESTER, NOT APPLICABLE OR UNSPECIFIED: ICD-10-CM

## 2021-02-18 ENCOUNTER — APPOINTMENT (OUTPATIENT)
Dept: ANTEPARTUM | Facility: CLINIC | Age: 31
End: 2021-02-18
Payer: COMMERCIAL

## 2021-02-18 DIAGNOSIS — N83.201 UNSPECIFIED OVARIAN CYST, RIGHT SIDE: ICD-10-CM

## 2021-02-18 PROBLEM — O24.419 GESTATIONAL DIABETES MELLITUS IN PREGNANCY, UNSPECIFIED CONTROL: Chronic | Status: ACTIVE | Noted: 2021-02-16

## 2021-02-18 LAB
CULTURE RESULTS: SIGNIFICANT CHANGE UP
GROUP B BETA STREP DNA (PCR): SIGNIFICANT CHANGE UP
GROUP B BETA STREP INTERPRETATION: SIGNIFICANT CHANGE UP
SOURCE GROUP B STREP: SIGNIFICANT CHANGE UP
SPECIMEN SOURCE: SIGNIFICANT CHANGE UP

## 2021-02-18 PROCEDURE — 99214 OFFICE O/P EST MOD 30 MIN: CPT | Mod: 95

## 2021-02-18 RX ORDER — GLUCAGON 1 MG
1 KIT INJECTION
Qty: 1 | Refills: 0 | Status: ACTIVE | COMMUNITY
Start: 2021-02-18 | End: 1900-01-01

## 2021-02-18 NOTE — SURGICAL HISTORY
[Fibroids] : fibroids [Last Pap: ___] : Last Pap: [unfilled] [Abn Paps] : no abnormal pap smears [STI's] : no STI's [Breast Disease] : no breast disease [Infertility] : no infertility [Cysts] : no cysts [OC Use] : no OC use

## 2021-02-18 NOTE — REVIEW OF SYSTEMS
Daily Note     Today's date: 2019  Patient name: Lo Nicholson  : 1960  MRN: [id number]  Referring provider: Simón Olivas DO  Dx:   Encounter Diagnosis     ICD-10-CM    1  Status post above knee amputation of left lower extremity ([facility] [address] ) [id number] [id number]        Start Time: 1535  Stop Time: 1618  Total time in clinic (min): 43 minutes    Subjective: expresses frustration; wants to be able to walk without AD, return home      Objective: See treatment diary below      Assessment: prosthetist returned today w/ leg, modifications complete and trial of new liner; prosthetic fit was much improved, continues to c/o medial/ posterior pain at rim of prosthesis but otherwise leg feels good; posture and gait improved significantly; ambulates w/ walker and CS/CG w/ prosthesis      Plan: Continue per plan of care       Precautions: none  PMH: PACEMAKER, CHF, PAD, DM  POC expires: 3/4/19  Daily Treatment Diary     Manual           Hip flexor stretch L                                                                     Exercise Diary           Hip extension into roll             Bridges             Prone lying             Prone hip ext             Hip flexor stretch             Standing bal    3-4min         Wt shift ->L  Man facil 10-15x          Standing balance w/ UE raises  CS @ walker            Gait training in ll bars  20' CS  walker 45'x2 walker 60'x3         Side stepping             biodex                                                                                                        Prosthetic mgt  performed performed w/ prosthetist performed w/ prosthetist         GMI: laterality recognize john feet  vanilla/context               Modalities    [Nl] : Hematologic/Lymphatic

## 2021-02-18 NOTE — HISTORY OF PRESENT ILLNESS
[Home] : at home, [unfilled] , at the time of the visit. [Medical Office: ([facility])___] : at the medical office located in  [Verbal consent obtained from patient] : the patient, [unfilled] [FreeTextEntry1] : Ms. Harley is a 30 year old  at 33w1d w/ DEANNE 21 by LMP and c/w 1st trimester sonogram referred by Dr. Castro for gestational diabetes. She has been taking fingersticks since the beginning of February. Started taking NPH 6U at night 2wks ago. She also has known fibroid uterus, largest 10.7cm. She is experiencing daily pain from this. Was previously taking indomethacin, now only Tylenol. Good fetal movements. No ctx, LOF, VB. \par \par 21 \par 3hr GTT: 95/223/142/85\par \par 20 \par AFP wnl

## 2021-02-18 NOTE — DISCUSSION/SUMMARY
[FreeTextEntry1] : 30 year old  at 33w1d w/ DEANNE 21 by LMP and c/w 1st trimester sonogram referred by Dr. Castro for gestational diabetes. She has been taking fingersticks since the beginning of February. Started taking NPH 6U at night 2wks ago. She also has known fibroid uterus, largest 10.7cm. She experiencing daily pain from this. Was previously taking indomethacin, now only Tylenol. Good fetal movements. No ctx, LOF, VB. \par \par #GDMA2\par - FS log: fasting ; 2hr PP . Majority of values elevated. \par - Usually 1-2 2hr PP recorded per day as patient does not usually eat dinner, encouraged to have 3 meals/day\par - CURRENT REGIMEN: 6U NPH at night\par - NEW REGIMEN: 2U NPH AM/ 10U NPH at night\par - diabetic teaching and nutrition counseling (to be scheduled via telehealth)\par - Discussed risks of GDM including but not limited to: macrosomia, shoulder dystocia, increased risk of , stillbirth, NICU admission for hypoglycemia and jaundice, and preeclampsia. Explained that if sugars are well controlled the above complications decrease. \par - FS goal fasting <95, 2 hour PP<120. Encouraged to document FS. \par - Discussed increased risk of type II DM later in life and that we will screen for that after pregnancy. \par \par #fibroid uterus\par - largest fibroid subserosal, approximately 10.7cm\par - s/p indomethacin\par - tylenol PRN\par \par #pregnancy\par - PNVs\par - low risk sequential 1\par - possible VSD on anatomy scan, need to ask pt if she went for fetal echo\par - remainder of records to be faxed by PMD's office when open\par - polyhydramnios at last scan (MVP 11.53cm), will follow\par - weekly BPP/NST to begin next week, monthly EFW\par - last EFW 1814gms (56%)\par - Prenatal care with Dr. Castro, appt next week\par \par  labor precautions, FKC instructions, PO hydration encouraged\par RTC 2 wks with FS log

## 2021-02-18 NOTE — ACTIVE PROBLEMS
[Hypertension] : no hypertension [Heart Disease] : no heart disease [Autoimmune Disease] : no autoimmune disease [Neurologic Disorder] : no neurologic disorder, no epilepsy [Renal Disease] : no kidney disease, no UTI [Psychiatric Disorders] : no psychiatric disorders [Depression] : no depression, no post partum depression [Hepatic Disorder] : no hepatitis, no liver disease [Thrombophlebitis] : no varicosities, no phlebitis [Thyroid Disorder] : no thyroid dysfunction [Trauma] : no trauma/violence [Blood Transfusion (___ Ml)] : no history of blood transfusion

## 2021-02-18 NOTE — END OF VISIT
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  The patient will be evaluated by a nutritionist and instructed in adhering to a diabetic diet.\par \par 2.  She is performing capillary blood glucose testing (fasting and 2 hours after each meal).\par \par 3.  The significance and usual management of diabetes in pregnancy were reviewed, including risks of preeclampsia, Intra-Uterine Fetal Demise, macrosomia, birth trauma, pre-term labor,  section, NICU admission (the main reasons include  hypoglycemia and  jaundice) and the possible need for insulin therapy.\par \par 4.  Exercise was encouraged.  Ideally, she should walk briskly  after each meal.\par \par Recommendations:\par \par 1.  Glycemic Control.  Target glucose ranges to minimize excessive fetal growth are:  Fasting 60-90 mg/dl; preprandial  mg/dl; and 2-hour postprandial < 120 mg/dl.  If these values are elevated, insulin therapy is encouraged, although oral hypoglycemic agents are reasonable to try depending on the finger stick log.\par \par 2.  Antepartum testing.  Daily fetal movement counts are recommended from 28 weeks.  Weekly or twice weekly biophysical profiles are recommended, the timing will depend on glucose control.  Ultrasound assessment of fetal growth and macrosomic features is recommended.\par \par 3.  Timing of Delivery.  If spontaneous delivery has not occurred by 39 weeks gestation, delivery may be planned between 39-40 weeks.   If complications, such as preeclampsia, macrosomia or poor glucose control develop, then delivery plans may need to be revised.\par \par 4.  Route of delivery.  Diabetes is associated with about a six-fold risk for shoulder dystocia.  Operative vaginal deliveries should be approached with caution if at all.\par \par 5.  Glucose control in labor.  During labor, capillary glucose values should be checked every few hours (depending on their stability).  Insulin may be required to cover elevated glucose levels.\par \par 6.  Long-term diabetes surveillance.  The risk of developing diabetes outside of pregnancy over the next five years may be as high as 50%.  I recommend that she have a fasting plasma glucose at 6 to 12 weeks postpartum and counseling about ways to minimize her risk.  If her fasting glucose is normal, annual glucose screening by her primary care physician is advised.\par \par She is already checking fingersticks.  At times she only eats 2 meals a day.  We recommend 3 meals a day.\par \par Fasting fingersticks elevated.  She is taking NPH 6 units at night. We will increase to 10 units at night.  We will also add NPH 2 units in the morning.\par \par Recommend weekly biophysical profiles with non stress tests.\par Prenatal care is with Dr. Castro.\par \par Hypoglycemia, fetal movement, and labor precautions discussed.  Glucagon prescribed for use in an emergency  (if hypoglycemia and nonresponsive).\par \par RTC 2 weeks.\par \par Ms. Harley expressed understanding and all of her questions were answered to her satisfaction.  Thank you for this consult.\par \par Johny Hernandez MD\par

## 2021-02-20 LAB
A VAGINAE DNA VAG QL NAA+PROBE: SIGNIFICANT CHANGE UP
BVAB2 DNA VAG QL NAA+PROBE: SIGNIFICANT CHANGE UP
C ALBICANS DNA VAG QL NAA+PROBE: NEGATIVE — SIGNIFICANT CHANGE UP
C GLABRATA DNA VAG QL NAA+PROBE: NEGATIVE — SIGNIFICANT CHANGE UP
C KRUSEI DNA VAG QL NAA+PROBE: NEGATIVE — SIGNIFICANT CHANGE UP
C LUSITANIAE DNA VAG QL NAA+PROBE: NEGATIVE — SIGNIFICANT CHANGE UP
MEGA1 DNA VAG QL NAA+PROBE: SIGNIFICANT CHANGE UP
T VAGINALIS RRNA SPEC QL NAA+PROBE: NEGATIVE — SIGNIFICANT CHANGE UP

## 2021-02-20 NOTE — ED PROVIDER NOTE - CROS ED SKIN ALL NEG
ED Physician Assistant Note      Patient : Unique Kebede Age: 24 year old Sex: female   MRN: [id number] Encounter Date: 2/19/2021      Chief Complaint   Patient presents with   • Vaginal Bleeding     14 weeks preg             History     HPI  This is a 24-year-old G3, P1 at approximately 14 weeks by ultrasound performed earlier today.  Patient presents for evaluation of abdominal cramping and vaginal bleeding.  Patient was seen here earlier today for same complaints that started early this morning.  She was diagnosed with probable miscarriage as ultrasound demonstrated single live IUP with detectable heart rate however also revealed significant decrease in amniotic fluid and patient was noted to have decreasing beta hCG quant from prior visit.  At the visit this AM patient had been bleeding less than 1 pad, how after completing work-up patient was discharged home with plan to follow-up with OB/GYN outpatient on Monday.   Patient notes pain is increased and bleeding has been constant since discharge home more than 1 pad per hour and passing large clots.  She denies fever,dysuria, flank pain.        No Known Allergies    Past Medical History:   Diagnosis Date   • Bipolar 1 disorder (CMS/HCC)    • Essential (primary) hypertension    • Migraines    • Seizures (CMS/HCC)        Past Surgical History:   Procedure Laterality Date   • NO PAST SURGERIES         Family History   Problem Relation Age of Onset   • Seizure Disorder Mother    • Patient is unaware of any medical problems Father        Social History     Tobacco Use   • Smoking status: Former Smoker     Packs/day: 0.25     Types: Cigarettes     Start date: 11/19/2020     Quit date: 2/21/2021   • Smokeless tobacco: Never Used   Substance Use Topics   • Alcohol use: Not Currently   • Drug use: Not Currently     Types: Cannabinols       Review of Systems   Constitutional: Negative for chills and fever.   HENT: Negative for ear pain and sore throat.    Eyes: Negative for  discharge and visual disturbance.   Respiratory: Negative for shortness of breath and wheezing.    Cardiovascular: Negative for chest pain and leg swelling.   Gastrointestinal: Positive for abdominal pain. Negative for diarrhea and vomiting.   Genitourinary: Positive for vaginal bleeding. Negative for dysuria and flank pain.   Musculoskeletal: Negative for neck pain and neck stiffness.   Skin: Negative for pallor and rash.   Neurological: Negative for dizziness and headaches.   Psychiatric/Behavioral: Negative for hallucinations and suicidal ideas.        Physical Exam     ED Triage Vitals [02/19/21 1934]   ED Triage Vitals Group      Temp 100 °F (37.8 °C)      Heart Rate 89      Resp 16      /62      SpO2 100 %      EtCO2 mmHg       Height       Weight       Weight Scale Used       BMI (Calculated)       IBW/kg (Calculated)        Visit Vitals  /63 (BP Location: LUE - Left upper extremity, Patient Position: Supine)   Pulse 84   Temp 100 °F (37.8 °C) (Oral)   Resp 16   LMP 11/12/2020   SpO2 100%      Patients pulse Ox is 100  % on RA which is normal    Physical Exam  Vitals signs and nursing note reviewed. Exam conducted with a chaperone present.   Constitutional:       General: She is not in acute distress.     Appearance: She is not toxic-appearing.   HENT:      Head: Normocephalic and atraumatic.   Eyes:      General: No scleral icterus.     Conjunctiva/sclera: Conjunctivae normal.   Neck:      Musculoskeletal: Neck supple. No neck rigidity.   Cardiovascular:      Rate and Rhythm: Normal rate and regular rhythm.      Heart sounds: Normal heart sounds. No murmur.   Pulmonary:      Effort: Pulmonary effort is normal.      Breath sounds: No wheezing.   Abdominal:      General: There is no distension.      Palpations: Abdomen is soft.      Tenderness: There is abdominal tenderness (Minimal Suprapubic). There is no right CVA tenderness, left CVA tenderness or guarding.   Genitourinary:     General: Normal  vulva.      Vagina: No vaginal discharge.      Comments: Scan blood in vaginal vault, No brisk bleeding, no clots or tissue, cervix appears closed.      RN Yamila Medina  Musculoskeletal:         General: No tenderness or deformity.   Skin:     General: Skin is warm and dry.      Capillary Refill: Capillary refill takes less than 2 seconds.      Findings: No rash.   Neurological:      General: No focal deficit present.      Mental Status: She is alert and oriented to person, place, and time.   Psychiatric:         Mood and Affect: Mood normal.         Behavior: Behavior normal.           ED Course     Procedures    Lab Results     Results for orders placed or performed during the hospital encounter of 02/19/21   Comprehensive Metabolic Panel   Result Value Ref Range    Fasting Status      Sodium 138 135 - 145 mmol/L    Potassium 3.1 (L) 3.4 - 5.1 mmol/L    Chloride 102 98 - 107 mmol/L    Carbon Dioxide 25 21 - 32 mmol/L    Anion Gap 14 10 - 20 mmol/L    Glucose 126 (H) 65 - 99 mg/dL    BUN 7 6 - 20 mg/dL    Creatinine 0.47 (L) 0.51 - 0.95 mg/dL    Glomerular Filtration Rate >90 >90 mL/min/1.73m2    BUN/ Creatinine Ratio 15 7 - 25    Calcium 8.7 8.4 - 10.2 mg/dL    Bilirubin, Total 0.2 0.2 - 1.0 mg/dL    GOT/AST 8 <=37 Units/L    GPT/ALT 21 <64 Units/L    Alkaline Phosphatase 77 45 - 117 Units/L    Albumin 3.2 (L) 3.6 - 5.1 g/dL    Protein, Total 7.2 6.4 - 8.2 g/dL    Globulin 4.0 2.0 - 4.0 g/dL    A/G Ratio 0.8 (L) 1.0 - 2.4   Beta HCG Quantitative Pregnancy   Result Value Ref Range    HCG, Quantitative 27,914 (H) <=4 mUnits/mL   CBC with Automated Differential (performable only)   Result Value Ref Range    WBC 15.5 (H) 4.2 - 11.0 K/mcL    RBC 3.62 (L) 4.00 - 5.20 mil/mcL    HGB 10.1 (L) 12.0 - 15.5 g/dL    HCT 32.1 (L) 36.0 - 46.5 %    MCV 88.7 78.0 - 100.0 fl    MCH 27.9 26.0 - 34.0 pg    MCHC 31.5 (L) 32.0 - 36.5 g/dL    RDW-CV 16.9 (H) 11.0 - 15.0 %    RDW-SD 54.8 (H) 39.0 - 50.0 fL     140 - 450 K/mcL     NRBC 0 <=0 /100 WBC    Neutrophil, Percent 78 %    Lymphocytes, Percent 15 %    Mono, Percent 4 %    Eosinophils, Percent 1 %    Basophils, Percent 1 %    Immature Granulocytes 1 %    Absolute Neutrophils 12.2 (H) 1.8 - 7.7 K/mcL    Absolute Lymphocytes 2.3 1.0 - 4.8 K/mcL    Absolute Monocytes 0.6 0.3 - 0.9 K/mcL    Absolute Eosinophils  0.2 0.0 - 0.5 K/mcL    Absolute Basophils 0.1 0.0 - 0.3 K/mcL    Absolute Immmature Granulocytes 0.2 0.0 - 0.2 K/mcL       EKG Results       Radiology Results     Imaging Results    None         ED Medication Orders (From admission, onward)    None          ED Course as of  Glucose(!): 126 [WS]    BHCG trending down from 30k this AM, now 27k,  No brisk vaginal bleeding, no clots or tissue.   Hgb trending down from 10.5 to 10.1     [WS]      ED Course User Index  [WS] Salvador Mercado PA-C       Multiple diagnoses were considered including threatened miscarriage, spontaneous , subchorionic hemorrhage, uterine fibroids, endometriosis, ruptured ovarian cyst, polycystic ovarian disease, vaginitis, pelvic inflammatory disease, cervical CA.   Based on my exam and work-up I feel the patient's symptoms are most consistent with: Threatened miscarriage      MDM     History and physical exam as above  Afebrile non toxic, anxious, no distress  Abdomen soft, minimal suprapubic TTP.   Patient complains of pain, given po acetaminophen, requested stronger medication, appears comfortable for level of pain inferred, given 2mg IV morphine.  Vaginal exam with scant blood, no clots or tissue, no brisk bleeding,   CBC with Hgb 10.1 decreased from 10.5 this AM, wbc 15.5, mild left shift,  normal platelets.  Afebrile, WBC likely reactive  CMP with potassium 3.1 otherwise no significant abnormalities, given 40 meq po KCL in ED  B Hcg 27K, decreased from 30K this AM,  Concerning for impending miscarriage  US this am with Single Live IUP, little to no  amniotic fluid, no repeat imaging this visit as unlikely to change clinical management.  Patient discussed with OB/GYn Dr. Álvarez, recommends follow up Outpatient [facility] in 3 days on Monday.  Plan dc home, follow up [facility] 3 days  Plan of care discussed with patient, all questions answered, anxious but agrees with plan of care.   Return precautions discusssed.      The patient was advised that the workup and exam performed today in the emergency room is a limited exam and workup designed to ascertain whether or not an emergent medical condition exists. It is also intended to assess if the patient's condition warrants admission to the hospital for further evaluation and treatment, or if the patient is stable enough to be discharged and managed at home or in an outpatient setting.   The patient has been instructed to follow up with his or her primary care physician or approrpriate specialist within the period of time indicated for further evaluation and treatment as deemed necessary.      Clinical Impression     ED Diagnosis   1. Threatened          Disposition          Discharge 2021 10:16 PM  Unique Kebede discharge to home/self care.         Summary of your Discharge Medications      You have not been prescribed any medications.           Salvador Mercado PA-C   2021 7:34 PM                      Salvador Mercado PA-C  [phone]     negative...

## 2021-02-23 ENCOUNTER — APPOINTMENT (OUTPATIENT)
Dept: OBGYN | Facility: CLINIC | Age: 31
End: 2021-02-23

## 2021-02-25 ENCOUNTER — ASOB RESULT (OUTPATIENT)
Age: 31
End: 2021-02-25

## 2021-02-25 ENCOUNTER — APPOINTMENT (OUTPATIENT)
Dept: ANTEPARTUM | Facility: CLINIC | Age: 31
End: 2021-02-25
Payer: COMMERCIAL

## 2021-02-25 ENCOUNTER — OUTPATIENT (OUTPATIENT)
Dept: OUTPATIENT SERVICES | Facility: HOSPITAL | Age: 31
LOS: 1 days | Discharge: HOME | End: 2021-02-25

## 2021-02-25 PROCEDURE — ZZZZZ: CPT

## 2021-02-25 PROCEDURE — 76818 FETAL BIOPHYS PROFILE W/NST: CPT | Mod: 26

## 2021-02-26 ENCOUNTER — NON-APPOINTMENT (OUTPATIENT)
Age: 31
End: 2021-02-26

## 2021-02-26 DIAGNOSIS — Z3A.34 34 WEEKS GESTATION OF PREGNANCY: ICD-10-CM

## 2021-02-26 DIAGNOSIS — O40.9XX0 POLYHYDRAMNIOS, UNSPECIFIED TRIMESTER, NOT APPLICABLE OR UNSPECIFIED: ICD-10-CM

## 2021-02-26 DIAGNOSIS — O34.10 MATERNAL CARE FOR BENIGN TUMOR OF CORPUS UTERI, UNSPECIFIED TRIMESTER: ICD-10-CM

## 2021-03-04 ENCOUNTER — APPOINTMENT (OUTPATIENT)
Dept: ANTEPARTUM | Facility: CLINIC | Age: 31
End: 2021-03-04
Payer: COMMERCIAL

## 2021-03-04 ENCOUNTER — RESULT CHARGE (OUTPATIENT)
Age: 31
End: 2021-03-04

## 2021-03-04 ENCOUNTER — ASOB RESULT (OUTPATIENT)
Age: 31
End: 2021-03-04

## 2021-03-04 ENCOUNTER — OUTPATIENT (OUTPATIENT)
Dept: OUTPATIENT SERVICES | Facility: HOSPITAL | Age: 31
LOS: 1 days | Discharge: HOME | End: 2021-03-04

## 2021-03-04 VITALS
WEIGHT: 187.19 LBS | TEMPERATURE: 98.7 F | DIASTOLIC BLOOD PRESSURE: 85 MMHG | BODY MASS INDEX: 34.24 KG/M2 | SYSTOLIC BLOOD PRESSURE: 132 MMHG | HEART RATE: 106 BPM | OXYGEN SATURATION: 97 %

## 2021-03-04 LAB
BILIRUB UR QL STRIP: NEGATIVE
CLARITY UR: CLEAR
COLLECTION METHOD: NORMAL
FETAL HEART DESCRIPTION: NORMAL
FETAL HEART RATE (BPM): 135
FETAL MOVEMENT: PRESENT
GLUCOSE BLDC GLUCOMTR-MCNC: 99
GLUCOSE BLDC GLUCOMTR-MCNC: 99 MG/DL — SIGNIFICANT CHANGE UP (ref 70–99)
GLUCOSE UR-MCNC: NEGATIVE
HCG UR QL: 0.2 EU/DL
HGB UR QL STRIP.AUTO: NEGATIVE
KETONES UR-MCNC: NEGATIVE
LEUKOCYTE ESTERASE UR QL STRIP: NEGATIVE
NITRITE UR QL STRIP: NEGATIVE
OB COMMENTS: NORMAL
PH UR STRIP: 6
PROT UR STRIP-MCNC: NEGATIVE
SCHEDULED VISIT: YES
SP GR UR STRIP: 1.01
URINE ALBUMIN/PROTEIN: NEGATIVE
URINE GLUCOSE: NEGATIVE
URINE KETONES: NEGATIVE
WEEKS GESTATION: 35.1

## 2021-03-04 PROCEDURE — ZZZZZ: CPT

## 2021-03-04 PROCEDURE — 76818 FETAL BIOPHYS PROFILE W/NST: CPT | Mod: 26

## 2021-03-04 PROCEDURE — 99213 OFFICE O/P EST LOW 20 MIN: CPT | Mod: 25

## 2021-03-04 NOTE — DISCUSSION/SUMMARY
[FreeTextEntry1] : Ms. Harley is a 30 year old  at 35w1d w/ DEANNE 21 by LMP and c/w 1st trimester sonogram referred by Dr. Castro for gestational diabetes. She also has known fibroid uterus, largest 10.7cm. She is experiencing daily pain from this. Was previously taking indomethacin, now only Tylenol. Good fetal movements. No ctx, LOF, VB. \par \par #GDMA2\par - FS today 99\par - FS log: fasting , 2hr PP . Mostly well controlled. Many missing values as patient snacks often between meals\par - CURRENT REGIMEN: NPH 4U AM (increased from 2U ), 10U PM\par - diabetic teaching and nutrition counseling\par - Discussed risks of GDM including but not limited to: macrosomia, shoulder dystocia, increased risk of , stillbirth, NICU admission for hypoglycemia and jaundice, and preeclampsia. Explained that if sugars are well controlled the above complications decrease. \par - FS goal fasting <95, 2 hour PP<120. Encouraged to document FS. \par - Discussed increased risk of type II DM later in life and that we will screen for that after pregnancy. \par \par #fibroid uterus\par - largest fibroid subserosal, approximately 10.7 cm\par - s/p indomethacin\par - tylenol PRN\par \par #tachycardia\par - HR today 106bpm\par - reports tachycardia throughout the pregnancy, had Holter monitor several weeks ago which only demonstrated tachycardia, no concerning events\par - continue to monitor\par \par #pregnancy\par - PNVs\par - low risk sequential 1\par - possible VSD on anatomy scan, fetal echo wnl\par - weekly BPP/NST, polyhydramnios MVP 9.25cm \par - monthly EFW 3/4/2021 2499 grams (35th percentile)\par - Prenatal care with Dr. Castro, appt next Tuesday\par \par  labor precautions, FKC instructions, PO hydration encouraged\par RTC 2 wks with FS log, c/w weekly BPP/NST

## 2021-03-04 NOTE — HISTORY OF PRESENT ILLNESS
[FreeTextEntry1] : Ms. Harley is a 30 year old  at 35w1d w/ DEANNE 21 by LMP and c/w 1st trimester sonogram referred by Dr. Castro for gestational diabetes. She also has known fibroid uterus, largest 10.7cm. She is experiencing daily pain from this. Was previously taking indomethacin, now only Tylenol. Good fetal movements. No ctx, LOF, VB. \par \par 21 \par 3hr GTT: 95/223/142/85\par \par 20 \par AFP wnl

## 2021-03-04 NOTE — END OF VISIT
[FreeTextEntry3] : [facility] Staff\par \par I saw and evaluated Ms. GOODE with Dr. Carr and I agree with the documentation above.   I modified the note above (if indicated) and agree with its contents in the present form.\par \par GDM,on insulin.  Fingersticks overall well controlled on the current regimen.\par Hx maternal tachycardia, s/p Holter monitor.\par \par Fibroid uterus.\par \par Biophysical profile 10/10.\par \par Fetal movement and labor precautions discussed.\par Prenatal care is with Dr. Castro.\par Weekly biophysical profiles.\par RTC 2 weeks.\par \par Johny Hernandez MD\par \par \par \par \par \par \par \par

## 2021-03-05 DIAGNOSIS — O24.414 GESTATIONAL DIABETES MELLITUS IN PREGNANCY, INSULIN CONTROLLED: ICD-10-CM

## 2021-03-05 DIAGNOSIS — Z3A.35 35 WEEKS GESTATION OF PREGNANCY: ICD-10-CM

## 2021-03-05 DIAGNOSIS — O40.9XX0 POLYHYDRAMNIOS, UNSPECIFIED TRIMESTER, NOT APPLICABLE OR UNSPECIFIED: ICD-10-CM

## 2021-03-05 DIAGNOSIS — O34.10 MATERNAL CARE FOR BENIGN TUMOR OF CORPUS UTERI, UNSPECIFIED TRIMESTER: ICD-10-CM

## 2021-03-09 ENCOUNTER — NON-APPOINTMENT (OUTPATIENT)
Age: 31
End: 2021-03-09

## 2021-03-09 ENCOUNTER — APPOINTMENT (OUTPATIENT)
Dept: OBGYN | Facility: CLINIC | Age: 31
End: 2021-03-09

## 2021-03-11 ENCOUNTER — APPOINTMENT (OUTPATIENT)
Dept: ANTEPARTUM | Facility: CLINIC | Age: 31
End: 2021-03-11
Payer: COMMERCIAL

## 2021-03-11 ENCOUNTER — OUTPATIENT (OUTPATIENT)
Dept: OUTPATIENT SERVICES | Facility: HOSPITAL | Age: 31
LOS: 1 days | Discharge: HOME | End: 2021-03-11

## 2021-03-11 ENCOUNTER — APPOINTMENT (OUTPATIENT)
Dept: OBGYN | Facility: CLINIC | Age: 31
End: 2021-03-11

## 2021-03-11 ENCOUNTER — ASOB RESULT (OUTPATIENT)
Age: 31
End: 2021-03-11

## 2021-03-11 DIAGNOSIS — O34.13 MATERNAL CARE FOR BENIGN TUMOR OF CORPUS UTERI, THIRD TRIMESTER: ICD-10-CM

## 2021-03-11 DIAGNOSIS — O24.414 GESTATIONAL DIABETES MELLITUS IN PREGNANCY, INSULIN CONTROLLED: ICD-10-CM

## 2021-03-11 DIAGNOSIS — Z3A.36 36 WEEKS GESTATION OF PREGNANCY: ICD-10-CM

## 2021-03-11 PROCEDURE — 76818 FETAL BIOPHYS PROFILE W/NST: CPT | Mod: 26

## 2021-03-11 PROCEDURE — ZZZZZ: CPT

## 2021-03-18 ENCOUNTER — ASOB RESULT (OUTPATIENT)
Age: 31
End: 2021-03-18

## 2021-03-18 ENCOUNTER — APPOINTMENT (OUTPATIENT)
Dept: ANTEPARTUM | Facility: CLINIC | Age: 31
End: 2021-03-18
Payer: COMMERCIAL

## 2021-03-18 ENCOUNTER — RESULT CHARGE (OUTPATIENT)
Age: 31
End: 2021-03-18

## 2021-03-18 ENCOUNTER — APPOINTMENT (OUTPATIENT)
Dept: OBGYN | Facility: CLINIC | Age: 31
End: 2021-03-18
Payer: COMMERCIAL

## 2021-03-18 ENCOUNTER — OUTPATIENT (OUTPATIENT)
Dept: OUTPATIENT SERVICES | Facility: HOSPITAL | Age: 31
LOS: 1 days | Discharge: HOME | End: 2021-03-18

## 2021-03-18 VITALS — HEART RATE: 125 BPM | DIASTOLIC BLOOD PRESSURE: 74 MMHG | SYSTOLIC BLOOD PRESSURE: 115 MMHG

## 2021-03-18 VITALS — HEART RATE: 114 BPM | DIASTOLIC BLOOD PRESSURE: 66 MMHG | SYSTOLIC BLOOD PRESSURE: 123 MMHG

## 2021-03-18 VITALS
WEIGHT: 187 LBS | DIASTOLIC BLOOD PRESSURE: 83 MMHG | TEMPERATURE: 97.4 F | OXYGEN SATURATION: 96 % | SYSTOLIC BLOOD PRESSURE: 141 MMHG | BODY MASS INDEX: 34.41 KG/M2 | HEIGHT: 62 IN | HEART RATE: 105 BPM

## 2021-03-18 DIAGNOSIS — O34.13 MATERNAL CARE FOR BENIGN TUMOR OF CORPUS UTERI, THIRD TRIMESTER: ICD-10-CM

## 2021-03-18 DIAGNOSIS — O24.414 GESTATIONAL DIABETES MELLITUS IN PREGNANCY, INSULIN CONTROLLED: ICD-10-CM

## 2021-03-18 DIAGNOSIS — O36.8390 MATERNAL CARE FOR ABNORMALITIES OF THE FETAL HEART RATE OR RHYTHM, UNSPECIFIED TRIMESTER, NOT APPLICABLE OR UNSPECIFIED: ICD-10-CM

## 2021-03-18 DIAGNOSIS — Z3A.37 37 WEEKS GESTATION OF PREGNANCY: ICD-10-CM

## 2021-03-18 DIAGNOSIS — D25.9 LEIOMYOMA OF UTERUS, UNSPECIFIED: ICD-10-CM

## 2021-03-18 DIAGNOSIS — O24.419 GESTATIONAL DIABETES MELLITUS IN PREGNANCY, UNSPECIFIED CONTROL: ICD-10-CM

## 2021-03-18 LAB
BILIRUB UR QL STRIP: NORMAL
CLARITY UR: CLEAR
COLLECTION METHOD: NORMAL
FETAL HEART RATE (BPM): 159
FETAL MOVEMENT: PRESENT
GLUCOSE BLDC GLUCOMTR-MCNC: 104
GLUCOSE BLDC GLUCOMTR-MCNC: 104 MG/DL — HIGH (ref 70–99)
GLUCOSE UR-MCNC: NORMAL
HCG UR QL: 0.2 EU/DL
HGB UR QL STRIP.AUTO: NORMAL
KETONES UR-MCNC: NORMAL
LEUKOCYTE ESTERASE UR QL STRIP: NORMAL
NITRITE UR QL STRIP: NORMAL
OB COMMENTS: NORMAL
PH UR STRIP: 8
PROT UR STRIP-MCNC: NORMAL
SP GR UR STRIP: 1.01
URINE ALBUMIN/PROTEIN: NORMAL
URINE GLUCOSE: NORMAL
URINE KETONES: NORMAL
WEEKS GESTATION: NORMAL

## 2021-03-18 PROCEDURE — 99214 OFFICE O/P EST MOD 30 MIN: CPT | Mod: 25

## 2021-03-18 PROCEDURE — 76818 FETAL BIOPHYS PROFILE W/NST: CPT | Mod: 26

## 2021-03-18 PROCEDURE — ZZZZZ: CPT

## 2021-03-18 NOTE — OB PROVIDER TRIAGE NOTE - NSHPLABSRESULTS_GEN_ALL_CORE
GBS negative  A pos    sonograms:  8/17: 6w5d, IUP  9/22: 11w6d, NT 1.05mm, NB present  11/18: 20w0d, 333g (52%), marginal insertion, ant loyw lying placenta, no major fetal malformations noted  12/16: 24w0d, 667g (49%), MVP 6.33cm, ant placenta, low lying, marginal insertion  2/4: 31w1d, 1814g (56%), MVP 8.81cm, transverse, ant placenta, low lying, marginal insertion  2/11: 32w1d, MVP 11.53cm, ant placenta, no previa, marginal insertion  2/25: 34w1d, MVP 9.25cm, breech, ant placenta, marginal insertion  3/4: 35w1d, 2499g (35%), MVP 10.15cm, vertex, ant placenta, no previa  3/11: 36w1d, MVP 9.58cm, transverse, ant placenta  3/18: 37w1d, MVP 11.5cm, vertex, ant placenta

## 2021-03-18 NOTE — OB PROVIDER TRIAGE NOTE - NSHPPHYSICALEXAM_GEN_ALL_CORE
Vital Signs Last 24 Hrs  HR: 110 (18 Mar 2021 17:07) (96 - 131)  BP: 130/69 (18 Mar 2021 17:07) (113/71 - 130/69)  RR: 20 (18 Mar 2021 15:35) (20 - 20)    GA; AAOx3, in NAD  CV: normal s1s2  Pulm: CTAB  abd: gravid, fibroid noted on fundus, nontender, no palpable contractions  EFM: 135/mod variability/accels+  toco; irregular  BSS: ant placenta, vertex, MVP 10.2cm, bpp 10/10

## 2021-03-18 NOTE — HISTORY OF PRESENT ILLNESS
[FreeTextEntry1] : Ms. Harley is a 30 year old  at 37w1d w/ DEANNE 21 by LMP and c/w 1st trimester sonogram referred by Dr. Castro for gestational diabetes. \par \par She also has known fibroid uterus, largest 10.7cm. \par No ctx, LOF, VB. \par \par 21 \par 3hr GTT: 95/223/142/85\par \par 20 \par AFP wnl

## 2021-03-18 NOTE — OB PROVIDER TRIAGE NOTE - HISTORY OF PRESENT ILLNESS
31 y/o  at 37w1d, DEANNE 21, dated by LMP c/w first trimester sonogram, was sent over from [facility] for an elevated blood pressure of 140/80's and for fetal tachycardia noted on NST, 170's. She denies any blood pressure problems throughout the pregnancy, headaches, vision changes, SOB, chest pain, RUQ/epigastric pain. She is GDMA2, on insulin 10u Humulin N at bedtime, and 4u in the morning. Fasting FS: <95's, PP: 's. She is polyhydramnios, MVP 11.5cm. She has  a h/o fibroid uterus, biggest fibroid at the fundus measuring 10cm. She denies any other complications in this pregnancy, LOF, vaginal bleeding, or contractions. Reports good fetal movement. GBS negative.

## 2021-03-18 NOTE — END OF VISIT
[FreeTextEntry3] : [facility] Staff\par \par I saw and evaluated Ms. GOODE with Dr. Lares and I agree with the documentation above.   I modified the note above (if indicated) and agree with its contents in the present form.\par \par GDM,on insulin.  Fingersticks overall well controlled on the current regimen. Occasionally elevated after dinner.\par Hx maternal tachycardia, s/p Holter monitor.\par \par Fibroid uterus.\par \par Biophysical profile 8/10 (NST baseline is 175 beats per minute (although it may be 155 beats per minute with prolonged accelerations).\par \par BP 130s-140s/80s in the office.\par \par We will send her to L&D for BP and fetal monitoring. \par \par Fetal movement and labor precautions discussed.\par Prenatal care is with Dr. Castro.\par Weekly biophysical profiles.\par RTC 2 weeks.\par \par Johny Hernandez MD\par \par \par \par \par \par \par \par

## 2021-03-18 NOTE — OB PROVIDER TRIAGE NOTE - NSOBPROVIDERNOTE_OBGYN_ALL_OB_FT
29 y/o  at 37w1d, GBS negative, with an elevated blood pressure, GDMA2, polyhydramnios and fibroid uterus, BPP 10/10, reassuring maternal and fetal status, for blood pressure monitoring  - vitals [id number]  - cont efm/toco  - if elevated blood pressure- draw preeclamptic labs    Dr. Hooker and Dr. Castro aware 31 y/o  at 37w1d, GBS negative, with an elevated blood pressure, GDMA2, polyhydramnios and fibroid uterus, BPP 10/10, reassuring maternal and fetal status, for blood pressure monitoring  - vitals [id number]  - cont efm/toco  - if elevated blood pressure- draw preeclamptic labs    Dr. Hooker and Dr. Castro aware    Addendum: Patient was seen and evaluated at bedside after 2 hours of prolonged blood pressure monitoring. She is feeling well, denies headaches, vision changes, chest pain, SOB, or RUQ/epigastric pain. Blood pressures have been in the normal range. In light of normal blood pressures and cat I tracing, patient can be discharged home.     Vital Signs Last 24 Hrs  HR: 114 (18 Mar 2021 17:37) (96 - 131)  BP: 123/66 (18 Mar 2021 17:37) (113/71 - 131/74)  RR: 20 (18 Mar 2021 15:35) (20 - 20)

## 2021-03-18 NOTE — OB PROVIDER TRIAGE NOTE - ADDITIONAL INSTRUCTIONS
If you have severe or regular abdominal cramping, if you think you broke your water, or if you have vaginal bleeding, call your Ob/Gyn or come back to labor and delivery.  Follow up with your Ob/Gyn as scheduled.

## 2021-03-18 NOTE — DISCUSSION/SUMMARY
[FreeTextEntry1] : Ms. Harley is a 30 year old  at 37w1d w/ DEANNE 21 by LMP and c/w 1st trimester sonogram referred by Dr. Castro for gestational diabetes. She also has known fibroid uterus, largest 10.7cm.  Was previously taking indomethacin, now only Tylenol. Good fetal movements. No ctx, LOF, VB. \par \par #GDMA2\par - FS today 104\par - FS fasting , 2hr postprandial  \par - CURRENT REGIMEN: NPH 4U AM  10U nighttime\par - diabetic teaching and nutrition counseling\par - Discussed risks of GDM including but not limited to: macrosomia, shoulder dystocia, increased risk of , stillbirth, NICU admission for hypoglycemia and jaundice, and preeclampsia. Explained that if sugars are well controlled the above complications decrease. \par - FS goal fasting <95, 2 hour PP<120. Encouraged to document FS. \par - Discussed increased risk of type II DM later in life and that we will screen for that after pregnancy. \par \par #fibroid uterus\par - largest fibroid subserosal, approximately 10.7 cm\par - s/p indomethacin\par - tylenol PRN\par \par #tachycardia\par - HR today 105bpm\par - reports tachycardia throughout the pregnancy, had Holter monitor several weeks ago which only demonstrated tachycardia, no concerning events\par - continue to monitor\par \par #pregnancy\par - PNVs\par - low risk sequential 1\par - possible VSD on anatomy scan, fetal echo wnl\par - weekly BPP/NST, polyhydramnios \par - monthly EFW 3/4/2021 2499 grams (35th percentile)\par - Prenatal care with Dr. Castro\par \par  labor precautions, [facility]KC instructions, PO hydration encouraged\par c/w weekly BPP/NST

## 2021-03-23 ENCOUNTER — OUTPATIENT (OUTPATIENT)
Dept: OUTPATIENT SERVICES | Facility: HOSPITAL | Age: 31
LOS: 1 days | Discharge: HOME | End: 2021-03-23

## 2021-03-23 VITALS
RESPIRATION RATE: 18 BRPM | DIASTOLIC BLOOD PRESSURE: 83 MMHG | TEMPERATURE: 98 F | HEART RATE: 98 BPM | SYSTOLIC BLOOD PRESSURE: 124 MMHG

## 2021-03-23 VITALS — SYSTOLIC BLOOD PRESSURE: 124 MMHG | DIASTOLIC BLOOD PRESSURE: 83 MMHG | HEART RATE: 98 BPM

## 2021-03-23 NOTE — OB PROVIDER TRIAGE NOTE - NSOBPROVIDERNOTE_OBGYN_ALL_OB_FT
31 y/o  at 37w6d, GBS negative, with LOF, not ruptured, with GDMA2 and polyhydramnios, with BPP 10/10, reassuring maternal and fetal status  - discharge home  - Labor precautions given  - encourage PO hydration  - f/u induction of labor on Tuesday as scheduled  - f/u with Dr. Castro on Thursday as scheduled    Dr. Borja and Dr. Whitmore aware

## 2021-03-23 NOTE — OB PROVIDER TRIAGE NOTE - HISTORY OF PRESENT ILLNESS
29 y/o  at 37w6d, DEANNE 21, dated by LMP c/w first trimester sonogram, presents with an episode of clear leakage of fluid at 1445, denies continuous leaking. Patient was seen in 3/18 for elevated BP in HRC, did not have any elevated BP's in L&D. She is GDMA2, on insulin 10u Humulin N at bedtime, and 4u in the morning. Fasting FS: <95's, PP: 's. She is polyhydramnios. She has  a h/o fibroid uterus, biggest fibroid at the fundus measuring 10cm. She denies any other complications in this pregnancy, headaches, vision changes, SOB, chest pain, RUQ/epigastric pain, vaginal bleeding, or contractions. Reports good fetal movement. GBS negative.

## 2021-03-23 NOTE — OB PROVIDER TRIAGE NOTE - NSHPLABSRESULTS_GEN_ALL_CORE
GBS negative  A pos    sonograms:  8/17: 6w5d, IUP  9/22: 11w6d, NT 1.05mm, NB present  11/18: 20w0d, 333g (52%), marginal insertion, ant low lying placenta, no major fetal malformations noted  12/16: 24w0d, 667g (49%), MVP 6.33cm, ant placenta, low lying, marginal insertion  2/4: 31w1d, 1814g (56%), MVP 8.81cm, transverse, ant placenta, low lying, marginal insertion  2/11: 32w1d, MVP 11.53cm, ant placenta, no previa, marginal insertion  2/25: 34w1d, MVP 9.25cm, breech, ant placenta, marginal insertion  3/4: 35w1d, 2499g (35%), MVP 10.15cm, vertex, ant placenta, no previa  3/11: 36w1d, MVP 9.58cm, transverse, ant placenta  3/18: 37w1d, MVP 11.5cm, vertex, ant placenta

## 2021-03-23 NOTE — OB PROVIDER TRIAGE NOTE - NSHPPHYSICALEXAM_GEN_ALL_CORE
Vital Signs Last 24 Hrs  T(C): 36.6 (23 Mar 2021 17:18), Max: 36.6 (23 Mar 2021 17:18)  T(F): 97.9 (23 Mar 2021 17:18), Max: 97.9 (23 Mar 2021 17:18)  HR: 98 (23 Mar 2021 17:18) (98 - 98)  BP: 124/83 (23 Mar 2021 17:18) (124/83 - 124/83)  RR: 18 (23 Mar 2021 17:18) (18 - 18)    EFM: 130/mod variability/accels+  toco: no contractions  abd: gravid, fibroid noted on fundus, nontender, no palpable contractions  speculum: neg pooling, neg ferning, neg Nitrazine  SVE: FT/0/-3  BSS: ant placenta, vertex, MVP 10.83cm, bpp 10/10

## 2021-03-25 ENCOUNTER — OUTPATIENT (OUTPATIENT)
Dept: OUTPATIENT SERVICES | Facility: HOSPITAL | Age: 31
LOS: 1 days | Discharge: HOME | End: 2021-03-25

## 2021-03-25 ENCOUNTER — APPOINTMENT (OUTPATIENT)
Dept: ANTEPARTUM | Facility: CLINIC | Age: 31
End: 2021-03-25
Payer: COMMERCIAL

## 2021-03-25 ENCOUNTER — ASOB RESULT (OUTPATIENT)
Age: 31
End: 2021-03-25

## 2021-03-25 ENCOUNTER — APPOINTMENT (OUTPATIENT)
Dept: OBGYN | Facility: CLINIC | Age: 31
End: 2021-03-25
Payer: COMMERCIAL

## 2021-03-25 PROCEDURE — 76816 OB US FOLLOW-UP PER FETUS: CPT | Mod: 26

## 2021-03-25 PROCEDURE — 76818 FETAL BIOPHYS PROFILE W/NST: CPT | Mod: 26

## 2021-03-25 PROCEDURE — ZZZZZ: CPT

## 2021-03-26 DIAGNOSIS — O34.13 MATERNAL CARE FOR BENIGN TUMOR OF CORPUS UTERI, THIRD TRIMESTER: ICD-10-CM

## 2021-03-26 DIAGNOSIS — O24.414 GESTATIONAL DIABETES MELLITUS IN PREGNANCY, INSULIN CONTROLLED: ICD-10-CM

## 2021-03-26 DIAGNOSIS — Z3A.38 38 WEEKS GESTATION OF PREGNANCY: ICD-10-CM

## 2021-03-30 ENCOUNTER — INPATIENT (INPATIENT)
Facility: HOSPITAL | Age: 31
LOS: 2 days | Discharge: HOME | End: 2021-04-02
Attending: OBSTETRICS & GYNECOLOGY | Admitting: OBSTETRICS & GYNECOLOGY

## 2021-03-30 VITALS — HEART RATE: 112 BPM | DIASTOLIC BLOOD PRESSURE: 82 MMHG | SYSTOLIC BLOOD PRESSURE: 117 MMHG

## 2021-03-30 LAB
BASOPHILS # BLD AUTO: 0.04 K/UL — SIGNIFICANT CHANGE UP (ref 0–0.2)
BASOPHILS NFR BLD AUTO: 0.4 % — SIGNIFICANT CHANGE UP (ref 0–1)
EOSINOPHIL # BLD AUTO: 0.12 K/UL — SIGNIFICANT CHANGE UP (ref 0–0.7)
EOSINOPHIL NFR BLD AUTO: 1.2 % — SIGNIFICANT CHANGE UP (ref 0–8)
GLUCOSE BLDC GLUCOMTR-MCNC: 85 MG/DL — SIGNIFICANT CHANGE UP (ref 70–99)
HCT VFR BLD CALC: 37.2 % — SIGNIFICANT CHANGE UP (ref 37–47)
HGB BLD-MCNC: 12.3 G/DL — SIGNIFICANT CHANGE UP (ref 12–16)
IMM GRANULOCYTES NFR BLD AUTO: 0.4 % — HIGH (ref 0.1–0.3)
LYMPHOCYTES # BLD AUTO: 3.12 K/UL — SIGNIFICANT CHANGE UP (ref 1.2–3.4)
LYMPHOCYTES # BLD AUTO: 31.2 % — SIGNIFICANT CHANGE UP (ref 20.5–51.1)
MCHC RBC-ENTMCNC: 27.6 PG — SIGNIFICANT CHANGE UP (ref 27–31)
MCHC RBC-ENTMCNC: 33.1 G/DL — SIGNIFICANT CHANGE UP (ref 32–37)
MCV RBC AUTO: 83.4 FL — SIGNIFICANT CHANGE UP (ref 81–99)
MONOCYTES # BLD AUTO: 0.55 K/UL — SIGNIFICANT CHANGE UP (ref 0.1–0.6)
MONOCYTES NFR BLD AUTO: 5.5 % — SIGNIFICANT CHANGE UP (ref 1.7–9.3)
NEUTROPHILS # BLD AUTO: 6.13 K/UL — SIGNIFICANT CHANGE UP (ref 1.4–6.5)
NEUTROPHILS NFR BLD AUTO: 61.3 % — SIGNIFICANT CHANGE UP (ref 42.2–75.2)
NRBC # BLD: 0 /100 WBCS — SIGNIFICANT CHANGE UP (ref 0–0)
PLATELET # BLD AUTO: 238 K/UL — SIGNIFICANT CHANGE UP (ref 130–400)
PRENATAL SYPHILIS TEST: SIGNIFICANT CHANGE UP
RBC # BLD: 4.46 M/UL — SIGNIFICANT CHANGE UP (ref 4.2–5.4)
RBC # FLD: 14.4 % — SIGNIFICANT CHANGE UP (ref 11.5–14.5)
WBC # BLD: 10 K/UL — SIGNIFICANT CHANGE UP (ref 4.8–10.8)
WBC # FLD AUTO: 10 K/UL — SIGNIFICANT CHANGE UP (ref 4.8–10.8)

## 2021-03-30 RX ORDER — OXYTOCIN 10 UNIT/ML
333.33 VIAL (ML) INJECTION
Qty: 20 | Refills: 0 | Status: DISCONTINUED | OUTPATIENT
Start: 2021-03-30 | End: 2021-04-01

## 2021-03-30 RX ORDER — SODIUM CHLORIDE 9 MG/ML
1000 INJECTION, SOLUTION INTRAVENOUS
Refills: 0 | Status: DISCONTINUED | OUTPATIENT
Start: 2021-03-30 | End: 2021-04-01

## 2021-03-30 RX ORDER — DINOPROSTONE 10 MG/241MG
10 INSERT VAGINAL ONCE
Refills: 0 | Status: COMPLETED | OUTPATIENT
Start: 2021-03-30 | End: 2021-03-30

## 2021-03-30 RX ADMIN — DINOPROSTONE 10 MILLIGRAM(S): 10 INSERT VAGINAL at 23:50

## 2021-03-30 NOTE — OB PROVIDER H&P - VDRL/RPR: DATE, OB PROFILE
10-Sep-2020
Pt with metastatic breast ca p/w hematuria and constipation, will check labs, ua, ct a/p, reassess

## 2021-03-30 NOTE — OB PROVIDER H&P - HISTORY OF PRESENT ILLNESS
31yo  @38w6d, DEANNE: 21 by LMP: 2020, presents for scheduled IOL for GDMA2. Denies contractions, vaginal bleeding, leakage of fluid. Good FM. Pregnancy complicated by GDMA2, Fasting FS: 90s, 2hPP: 100s-170s, on NPH 12U at night and 4U in AM. Also had sinus tachycardia throughout the pregnancy, cleared by cardiology. On ASA. Has 2 anterior fibroids, anterior myoma ([id number]), anterior myoma ([id number]). Denies other compliactions. GBS neg. 29yo  @38w6d, DEANNE: 21 by LMP: 2020, presents for scheduled IOL for GDMA2. Denies contractions, vaginal bleeding, leakage of fluid. Good FM. Pregnancy complicated by GDMA2, Fasting FS: 90s, 2hPP: 100s-170s, on NPH 12U at night and 4U in AM. Also had sinus tachycardia throughout the pregnancy, cleared by cardiology. On ASA. Has 2 anterior fibroids, anterior myoma ([id number]), anterior myoma (8m4e8ll). Possible VSD seen on fetal sono, fetal echo wnl. Patient received indomethacin for possible PTL. Denies other complications. GBS neg.

## 2021-03-30 NOTE — OB PROVIDER H&P - NSHPLABSRESULTS_GEN_ALL_CORE
GCT; 147  GTT: 95/223/142/85  Varicella: non-immune    Sonos:  38w1d: BPP 10/10, 3166g (41%), MVP 8.49cm, anterior myoma ([id number]), anterior myoma (2p4i4qg) GCT; 147  GTT: 95/223/142/85  Varicella: non-immune    Sonos:  6w5d: sIUP  11w6d: NT wnl  20w0d: possible VSD, otherwise normal anatomy, 333g (52%), anterior placenta, marginal cord insertion, MVP 4.49cm  24w0d: 667g (49%), MVP 6.33cm, anterior placenta, marginal cord insertion  31w1d: 1814g (56%), BPP 8/8, MVP 8.81cm, anterior placenta, marginal cord insertion  32w1d: BPP 8/8, MVP 11.53cm  34w1d: MVP 9.25cm, BPP 10/10  35w1d: 2499g (35%), MVP 10.15cm, anterior placenta, BPP 10/10  36w1d: MVP 9.58cm, BPP 10/10  37w1d: MVP 11.5cm, BPP 8/10 (equivocal NST)  38w1d: BPP 10/10, 3166g (41%), MVP 8.49cm, anterior myoma ([id number]), anterior myoma ([id number])

## 2021-03-30 NOTE — OB PROVIDER H&P - ASSESSMENT
29yo  @38w6d, GBS neg, sinus tachycardia in pregnancy, IOL for GDMA2.    -Admit to L&D  -Admission labs  -Monitor vitals  -Cont EFM/[place]  -Pain management prn  -Clear liquid diet  -FS q2h in active, q4h in latent labor    Dr. Whitmore to be made aware, Dr. Soto aware

## 2021-03-30 NOTE — OB RN PATIENT PROFILE - NS_DIETPREF_OBGYN_ALL_OB
Principal Discharge DX:	Liveborn infant, of [place] pregnancy, born in hospital by  delivery  Secondary Diagnosis:	SGA (small for gestational age) Regular

## 2021-03-30 NOTE — OB PROVIDER H&P - NSHPPHYSICALEXAM_GEN_ALL_CORE
Vital Signs Last 24 Hrs  T(C): 37.1 (30 Mar 2021 22:24), Max: 37.1 (30 Mar 2021 22:24)  T(F): 98.7 (30 Mar 2021 22:24), Max: 98.7 (30 Mar 2021 22:24)  HR: 112 (30 Mar 2021 22:24) (112 - 112)  BP: 117/82 (30 Mar 2021 22:24) (117/82 - 117/82)  RR: 18 (30 Mar 2021 22:24) (18 - 18)    Gen: AOx3, NAD  EFM: 140/mod/+accels  [place]: none  SVE: 0.5/0/-3, vertex, intact  BSS: cephalic

## 2021-03-31 LAB
AMPHET UR-MCNC: NEGATIVE — SIGNIFICANT CHANGE UP
APPEARANCE UR: ABNORMAL
APTT BLD: 28.9 SEC — SIGNIFICANT CHANGE UP (ref 27–39.2)
BACTERIA # UR AUTO: NEGATIVE — SIGNIFICANT CHANGE UP
BARBITURATES UR SCN-MCNC: NEGATIVE — SIGNIFICANT CHANGE UP
BASOPHILS # BLD AUTO: 0.03 K/UL — SIGNIFICANT CHANGE UP (ref 0–0.2)
BASOPHILS NFR BLD AUTO: 0.3 % — SIGNIFICANT CHANGE UP (ref 0–1)
BENZODIAZ UR-MCNC: NEGATIVE — SIGNIFICANT CHANGE UP
BILIRUB UR-MCNC: NEGATIVE — SIGNIFICANT CHANGE UP
BLD GP AB SCN SERPL QL: SIGNIFICANT CHANGE UP
BUPRENORPHINE SCREEN, URINE RESULT: NEGATIVE — SIGNIFICANT CHANGE UP
COCAINE METAB.OTHER UR-MCNC: NEGATIVE — SIGNIFICANT CHANGE UP
COLOR SPEC: YELLOW — SIGNIFICANT CHANGE UP
DIFF PNL FLD: NEGATIVE — SIGNIFICANT CHANGE UP
EOSINOPHIL # BLD AUTO: 0.06 K/UL — SIGNIFICANT CHANGE UP (ref 0–0.7)
EOSINOPHIL NFR BLD AUTO: 0.6 % — SIGNIFICANT CHANGE UP (ref 0–8)
EPI CELLS # UR: 2 /HPF — SIGNIFICANT CHANGE UP (ref 0–5)
FENTANYL UR QL: NEGATIVE — SIGNIFICANT CHANGE UP
FIBRINOGEN PPP-MCNC: >700 MG/DL — HIGH (ref 204.4–570.6)
GLUCOSE BLDC GLUCOMTR-MCNC: 74 MG/DL — SIGNIFICANT CHANGE UP (ref 70–99)
GLUCOSE BLDC GLUCOMTR-MCNC: 77 MG/DL — SIGNIFICANT CHANGE UP (ref 70–99)
GLUCOSE BLDC GLUCOMTR-MCNC: 77 MG/DL — SIGNIFICANT CHANGE UP (ref 70–99)
GLUCOSE BLDC GLUCOMTR-MCNC: 88 MG/DL — SIGNIFICANT CHANGE UP (ref 70–99)
GLUCOSE BLDC GLUCOMTR-MCNC: 89 MG/DL — SIGNIFICANT CHANGE UP (ref 70–99)
GLUCOSE BLDC GLUCOMTR-MCNC: 91 MG/DL — SIGNIFICANT CHANGE UP (ref 70–99)
GLUCOSE UR QL: NEGATIVE — SIGNIFICANT CHANGE UP
HCT VFR BLD CALC: 35.1 % — LOW (ref 37–47)
HGB BLD-MCNC: 11.7 G/DL — LOW (ref 12–16)
HIV 1 & 2 AB SERPL IA.RAPID: SIGNIFICANT CHANGE UP
HYALINE CASTS # UR AUTO: 2 /LPF — SIGNIFICANT CHANGE UP (ref 0–7)
IMM GRANULOCYTES NFR BLD AUTO: 0.4 % — HIGH (ref 0.1–0.3)
INR BLD: 1.02 RATIO — SIGNIFICANT CHANGE UP (ref 0.65–1.3)
KETONES UR-MCNC: NEGATIVE — SIGNIFICANT CHANGE UP
L&D DRUG SCREEN, URINE: SIGNIFICANT CHANGE UP
LEUKOCYTE ESTERASE UR-ACNC: NEGATIVE — SIGNIFICANT CHANGE UP
LYMPHOCYTES # BLD AUTO: 2.58 K/UL — SIGNIFICANT CHANGE UP (ref 1.2–3.4)
LYMPHOCYTES # BLD AUTO: 24.3 % — SIGNIFICANT CHANGE UP (ref 20.5–51.1)
MCHC RBC-ENTMCNC: 28.2 PG — SIGNIFICANT CHANGE UP (ref 27–31)
MCHC RBC-ENTMCNC: 33.3 G/DL — SIGNIFICANT CHANGE UP (ref 32–37)
MCV RBC AUTO: 84.6 FL — SIGNIFICANT CHANGE UP (ref 81–99)
METHADONE UR-MCNC: NEGATIVE — SIGNIFICANT CHANGE UP
MONOCYTES # BLD AUTO: 0.63 K/UL — HIGH (ref 0.1–0.6)
MONOCYTES NFR BLD AUTO: 5.9 % — SIGNIFICANT CHANGE UP (ref 1.7–9.3)
NEUTROPHILS # BLD AUTO: 7.29 K/UL — HIGH (ref 1.4–6.5)
NEUTROPHILS NFR BLD AUTO: 68.5 % — SIGNIFICANT CHANGE UP (ref 42.2–75.2)
NITRITE UR-MCNC: NEGATIVE — SIGNIFICANT CHANGE UP
NRBC # BLD: 0 /100 WBCS — SIGNIFICANT CHANGE UP (ref 0–0)
OPIATES UR-MCNC: NEGATIVE — SIGNIFICANT CHANGE UP
OXYCODONE UR-MCNC: NEGATIVE — SIGNIFICANT CHANGE UP
PCP UR-MCNC: NEGATIVE — SIGNIFICANT CHANGE UP
PH UR: 7.5 — SIGNIFICANT CHANGE UP (ref 5–8)
PLATELET # BLD AUTO: 230 K/UL — SIGNIFICANT CHANGE UP (ref 130–400)
PROPOXYPHENE QUALITATIVE URINE RESULT: NEGATIVE — SIGNIFICANT CHANGE UP
PROT UR-MCNC: SIGNIFICANT CHANGE UP
PROTHROM AB SERPL-ACNC: 11.7 SEC — SIGNIFICANT CHANGE UP (ref 9.95–12.87)
RBC # BLD: 4.15 M/UL — LOW (ref 4.2–5.4)
RBC # FLD: 14.6 % — HIGH (ref 11.5–14.5)
RBC CASTS # UR COMP ASSIST: 2 /HPF — SIGNIFICANT CHANGE UP (ref 0–4)
SP GR SPEC: 1.02 — SIGNIFICANT CHANGE UP (ref 1.01–1.03)
UROBILINOGEN FLD QL: SIGNIFICANT CHANGE UP
WBC # BLD: 10.63 K/UL — SIGNIFICANT CHANGE UP (ref 4.8–10.8)
WBC # FLD AUTO: 10.63 K/UL — SIGNIFICANT CHANGE UP (ref 4.8–10.8)
WBC UR QL: 1 /HPF — SIGNIFICANT CHANGE UP (ref 0–5)

## 2021-03-31 RX ORDER — NALOXONE HYDROCHLORIDE 4 MG/.1ML
0.1 SPRAY NASAL
Refills: 0 | Status: DISCONTINUED | OUTPATIENT
Start: 2021-03-31 | End: 2021-04-01

## 2021-03-31 RX ORDER — BUPIVACAINE HCL/PF 7.5 MG/ML
200 VIAL (ML) INJECTION
Refills: 0 | Status: DISCONTINUED | OUTPATIENT
Start: 2021-03-31 | End: 2021-04-01

## 2021-03-31 RX ORDER — DEXAMETHASONE 0.5 MG/5ML
4 ELIXIR ORAL EVERY 6 HOURS
Refills: 0 | Status: DISCONTINUED | OUTPATIENT
Start: 2021-03-31 | End: 2021-04-01

## 2021-03-31 RX ORDER — OXYTOCIN 10 UNIT/ML
2 VIAL (ML) INJECTION
Qty: 30 | Refills: 0 | Status: DISCONTINUED | OUTPATIENT
Start: 2021-03-31 | End: 2021-04-01

## 2021-03-31 RX ORDER — ONDANSETRON 8 MG/1
4 TABLET, FILM COATED ORAL EVERY 6 HOURS
Refills: 0 | Status: DISCONTINUED | OUTPATIENT
Start: 2021-03-31 | End: 2021-04-01

## 2021-03-31 RX ADMIN — Medication 2 MILLIUNIT(S)/MIN: at 13:40

## 2021-03-31 NOTE — PROCEDURE NOTE - NSANESTHEXAM_OBGYN_ALL_OB_FT
39 wk 2cm  IOL for gDM on insulin; poc glucose 77  c/o occasional shooting pain back and down R thigh ?cause

## 2021-03-31 NOTE — PROGRESS NOTE ADULT - SUBJECTIVE AND OBJECTIVE BOX
PGY2 Note    Patient seen at bedside for evaluation of labor progression, doing well, no complaints. Cervidil removed, patient tolerated well.    Vital Signs Last 24 Hrs  T(C): 36.9 (31 Mar 2021 06:00), Max: 37.1 (30 Mar 2021 22:24)  T(F): 98.42 (31 Mar 2021 06:00), Max: 98.7 (30 Mar 2021 22:24)  HR: 98 (31 Mar 2021 11:46) (82 - 112)  BP: 116/69 (31 Mar 2021 11:46) (85/54 - 131/95)  RR: 18 (30 Mar 2021 22:24) (18 - 18)    EFM: 120/mod aga/+accels  TOCO: q 4 mins  SVE: 2/50/-3, cervidil out    Medications:  dinoprostone Insert: 10 milliGRAM(s) (21 @ 23:50)      Labs:                        12.3   10.00 )-----------( 238      ( 30 Mar 2021 23:25 )             37.2           ABO RH Interpretation: A POS (21 @ 23:25)    Antibody Screen: NEG (21 @ 23:25)    Urinalysis Basic - ( 30 Mar 2021 23:25 )    Color: Yellow / Appearance: Slightly Turbid / S.021 / pH: x  Gluc: x / Ketone: Negative  / Bili: Negative / Urobili: <2 mg/dL   Blood: x / Protein: Trace / Nitrite: Negative   Leuk Esterase: Negative / RBC: 2 /HPF / WBC 1 /HPF   Sq Epi: x / Non Sq Epi: 2 /HPF / Bacteria: Negative      L&amp;D Drug Screen, Urine: Done (21 @ 23:25)    Prenatal Syphilis Test: Nonreact (21 @ 23:25)              A/P:   30y G P at w d, GBS , in labor  -continue pitocin  -pain management prn  -cont efm/toco  -f/u pending labs  -cont to monitor vitals  -cont iv hydration    Will make Dr. cast   PGY2 Note    Patient seen at bedside for evaluation of labor progression, doing well, no complaints. Cervidil removed, patient tolerated well.    Vital Signs Last 24 Hrs  T(C): 36.9 (31 Mar 2021 06:00), Max: 37.1 (30 Mar 2021 22:24)  T(F): 98.42 (31 Mar 2021 06:00), Max: 98.7 (30 Mar 2021 22:24)  HR: 98 (31 Mar 2021 11:46) (82 - 112)  BP: 116/69 (31 Mar 2021 11:46) (85/54 - 131/95)  RR: 18 (30 Mar 2021 22:24) (18 - 18)    EFM: 120/mod aga/+accels  TOCO: q 4 mins  SVE: 2/50/-3, cervidil out by Dr. Hooker    Medications:  dinoprostone Insert: 10 milliGRAM(s) (21 @ 23:50)      Labs:                        12.3   10.00 )-----------( 238      ( 30 Mar 2021 23:25 )             37.2           ABO RH Interpretation: A POS (21 @ 23:25)    Antibody Screen: NEG (21 @ 23:25)    Urinalysis Basic - ( 30 Mar 2021 23:25 )    Color: Yellow / Appearance: Slightly Turbid / S.021 / pH: x  Gluc: x / Ketone: Negative  / Bili: Negative / Urobili: <2 mg/dL   Blood: x / Protein: Trace / Nitrite: Negative   Leuk Esterase: Negative / RBC: 2 /HPF / WBC 1 /HPF   Sq Epi: x / Non Sq Epi: 2 /HPF / Bacteria: Negative      L&amp;D Drug Screen, Urine: Done (21 @ 23:25)    Prenatal Syphilis Test: Nonreact (21 @ 23:25)              A/P:   30y  at 39w0d, GBS neg , GDMA2, on NPH, h/o sinus tachy, cleared by cardio, two large ant fibroids, IOL, s/p cervidil, doing well  -start pitocin after break  -pain management prn  -cont efm/toco  -cont to monitor vitals  -cont iv hydration    Dr. Hooker and Dr. Castro aware

## 2021-03-31 NOTE — PROCEDURE NOTE - NSLOADDOSE_OBGYN_ALL_OB
Ropivacaine 0.5% 5cc; Bupivacaine 0.1% 15cc/hr misa infusion; fentanyl 50mcg bolus + 200mcg added to infusion

## 2021-03-31 NOTE — CHART NOTE - NSCHARTNOTEFT_GEN_A_CORE
Patient seen and examined at bedside after recurrent late decelerations, category 2 tracing, prolonged deceleration down to 60bpm for 6min, seen on EFM.   Pitocin discontinued, patient repositioned given IV fluid bolus of 500cc, and oxygen is currently being administered through non-rebreather.       Physical exam:  A+OX3. NAD  Gen: Soft, nontender. palpable intermittent contractions   SVE 2/80/-2 vertex     Reassuring maternal status    Dr. Castro aware

## 2021-03-31 NOTE — PROGRESS NOTE ADULT - SUBJECTIVE AND OBJECTIVE BOX
PGY I Note    S: Patient seen and examined at bedside. Doing well, no pain control desired at this time.    Vital Signs Last 24 Hrs  T(C): 36.9 (31 Mar 2021 02:30), Max: 37.1 (30 Mar 2021 22:24)  T(F): 98.42 (31 Mar 2021 02:30), Max: 98.7 (30 Mar 2021 22:24)  HR: 92 (31 Mar 2021 02:41) (85 - 112)  BP: 105/58 (31 Mar 2021 02:41) (105/58 - 118/81)  RR: 18 (30 Mar 2021 22:24) (18 - 18)    EFM: 130/mod/+accels  TOCO: none  SVE: 0.5/50/-3 @2255    Labs:                        12.3   10.00 )-----------( 238      ( 30 Mar 2021 23:25 )             37.2        ABO RH Interpretation: A POS (21 @ 23:25)    Urinalysis Basic - ( 30 Mar 2021 23:25 )    Color: Yellow / Appearance: Slightly Turbid / S.021 / pH: x  Gluc: x / Ketone: Negative  / Bili: Negative / Urobili: <2 mg/dL   Blood: x / Protein: Trace / Nitrite: Negative   Leuk Esterase: Negative / RBC: 2 /HPF / WBC 1 /HPF   Sq Epi: x / Non Sq Epi: 2 /HPF / Bacteria: Negative    Prenatal Syphilis Test: Nonreact (21 @ 23:25)    UDS: pending  Covid: neg     Meds:  cervidil @4349

## 2021-03-31 NOTE — PROCEDURE NOTE - ADDITIONAL PROCEDURE DETAILS
VSS post epidural placement  Analgesia at T10 R=L  FH 140s VSS post epidural placement  Analgesia at T10 R=L  FH 140s    1730  Period of hypotension followed by FH deceleration  Response to SHIRA ; fluid olus and phenylephrine 100mcg  VSS  FH 130s VSS post epidural placement  Analgesia at T10 R=L  FH 140s    1730  Period of hypotension followed by FH deceleration  Response to SHIRA ; fluid olus and phenylephrine 100mcg  VSS  FH 130s    01.15am Top off given to pt. 100mcg fentanyl with 5cc .25% bupivacaine.  VSS

## 2021-04-01 ENCOUNTER — APPOINTMENT (OUTPATIENT)
Dept: ANTEPARTUM | Facility: CLINIC | Age: 31
End: 2021-04-01

## 2021-04-01 LAB — GLUCOSE BLDC GLUCOMTR-MCNC: 78 MG/DL — SIGNIFICANT CHANGE UP (ref 70–99)

## 2021-04-01 RX ORDER — SIMETHICONE 80 MG/1
80 TABLET, CHEWABLE ORAL EVERY 4 HOURS
Refills: 0 | Status: DISCONTINUED | OUTPATIENT
Start: 2021-04-01 | End: 2021-04-02

## 2021-04-01 RX ORDER — HYDROCORTISONE 1 %
1 OINTMENT (GRAM) TOPICAL EVERY 6 HOURS
Refills: 0 | Status: DISCONTINUED | OUTPATIENT
Start: 2021-04-01 | End: 2021-04-02

## 2021-04-01 RX ORDER — DIPHENHYDRAMINE HCL 50 MG
25 CAPSULE ORAL EVERY 6 HOURS
Refills: 0 | Status: DISCONTINUED | OUTPATIENT
Start: 2021-04-01 | End: 2021-04-02

## 2021-04-01 RX ORDER — MAGNESIUM HYDROXIDE 400 MG/1
30 TABLET, CHEWABLE ORAL
Refills: 0 | Status: DISCONTINUED | OUTPATIENT
Start: 2021-04-01 | End: 2021-04-02

## 2021-04-01 RX ORDER — OXYCODONE HYDROCHLORIDE 5 MG/1
5 TABLET ORAL
Refills: 0 | Status: DISCONTINUED | OUTPATIENT
Start: 2021-04-01 | End: 2021-04-02

## 2021-04-01 RX ORDER — KETOROLAC TROMETHAMINE 30 MG/ML
30 SYRINGE (ML) INJECTION ONCE
Refills: 0 | Status: DISCONTINUED | OUTPATIENT
Start: 2021-04-01 | End: 2021-04-01

## 2021-04-01 RX ORDER — ACETAMINOPHEN 500 MG
975 TABLET ORAL
Refills: 0 | Status: DISCONTINUED | OUTPATIENT
Start: 2021-04-01 | End: 2021-04-02

## 2021-04-01 RX ORDER — TETANUS TOXOID, REDUCED DIPHTHERIA TOXOID AND ACELLULAR PERTUSSIS VACCINE, ADSORBED 5; 2.5; 8; 8; 2.5 [IU]/.5ML; [IU]/.5ML; UG/.5ML; UG/.5ML; UG/.5ML
0.5 SUSPENSION INTRAMUSCULAR ONCE
Refills: 0 | Status: DISCONTINUED | OUTPATIENT
Start: 2021-04-01 | End: 2021-04-01

## 2021-04-01 RX ORDER — DIBUCAINE 1 %
1 OINTMENT (GRAM) RECTAL EVERY 6 HOURS
Refills: 0 | Status: DISCONTINUED | OUTPATIENT
Start: 2021-04-01 | End: 2021-04-02

## 2021-04-01 RX ORDER — LANOLIN
1 OINTMENT (GRAM) TOPICAL EVERY 6 HOURS
Refills: 0 | Status: DISCONTINUED | OUTPATIENT
Start: 2021-04-01 | End: 2021-04-02

## 2021-04-01 RX ORDER — IBUPROFEN 200 MG
600 TABLET ORAL EVERY 6 HOURS
Refills: 0 | Status: COMPLETED | OUTPATIENT
Start: 2021-04-01 | End: 2022-02-28

## 2021-04-01 RX ORDER — IBUPROFEN 200 MG
600 TABLET ORAL EVERY 6 HOURS
Refills: 0 | Status: DISCONTINUED | OUTPATIENT
Start: 2021-04-01 | End: 2021-04-02

## 2021-04-01 RX ORDER — AER TRAVELER 0.5 G/1
1 SOLUTION RECTAL; TOPICAL EVERY 4 HOURS
Refills: 0 | Status: DISCONTINUED | OUTPATIENT
Start: 2021-04-01 | End: 2021-04-02

## 2021-04-01 RX ORDER — SODIUM CHLORIDE 9 MG/ML
3 INJECTION INTRAMUSCULAR; INTRAVENOUS; SUBCUTANEOUS EVERY 8 HOURS
Refills: 0 | Status: DISCONTINUED | OUTPATIENT
Start: 2021-04-01 | End: 2021-04-02

## 2021-04-01 RX ORDER — OXYCODONE HYDROCHLORIDE 5 MG/1
5 TABLET ORAL ONCE
Refills: 0 | Status: DISCONTINUED | OUTPATIENT
Start: 2021-04-01 | End: 2021-04-02

## 2021-04-01 RX ORDER — PRAMOXINE HYDROCHLORIDE 150 MG/15G
1 AEROSOL, FOAM RECTAL EVERY 4 HOURS
Refills: 0 | Status: DISCONTINUED | OUTPATIENT
Start: 2021-04-01 | End: 2021-04-02

## 2021-04-01 RX ORDER — BENZOCAINE 10 %
1 GEL (GRAM) MUCOUS MEMBRANE EVERY 6 HOURS
Refills: 0 | Status: DISCONTINUED | OUTPATIENT
Start: 2021-04-01 | End: 2021-04-02

## 2021-04-01 RX ORDER — OXYTOCIN 10 UNIT/ML
333.33 VIAL (ML) INJECTION
Qty: 20 | Refills: 0 | Status: DISCONTINUED | OUTPATIENT
Start: 2021-04-01 | End: 2021-04-02

## 2021-04-01 RX ADMIN — Medication 1 TABLET(S): at 14:27

## 2021-04-01 RX ADMIN — Medication 975 MILLIGRAM(S): at 14:27

## 2021-04-01 RX ADMIN — Medication 30 MILLIGRAM(S): at 07:29

## 2021-04-01 RX ADMIN — Medication 975 MILLIGRAM(S): at 22:00

## 2021-04-01 RX ADMIN — SODIUM CHLORIDE 3 MILLILITER(S): 9 INJECTION INTRAMUSCULAR; INTRAVENOUS; SUBCUTANEOUS at 14:39

## 2021-04-01 RX ADMIN — Medication 1 SPRAY(S): at 22:03

## 2021-04-01 RX ADMIN — Medication 30 MILLIGRAM(S): at 07:40

## 2021-04-01 RX ADMIN — Medication 975 MILLIGRAM(S): at 14:39

## 2021-04-01 RX ADMIN — Medication 1 APPLICATION(S): at 22:04

## 2021-04-01 RX ADMIN — SODIUM CHLORIDE 3 MILLILITER(S): 9 INJECTION INTRAMUSCULAR; INTRAVENOUS; SUBCUTANEOUS at 10:43

## 2021-04-01 RX ADMIN — MAGNESIUM HYDROXIDE 30 MILLILITER(S): 400 TABLET, CHEWABLE ORAL at 14:39

## 2021-04-01 NOTE — PROGRESS NOTE ADULT - ASSESSMENT
29yo  @38w6d, GBS neg, sinus tachycardia in pregnancy, IOL for GDMA2.    - Cont EFM/[place]  - IV Hydration  - Pain Management prn  - Monitor vitals  - Clear Liquids  - Cervidil in place    Dr. Whitmore and Dr. Soto aware  
A/P:  30y  at 39w1d, GBS neg , GDMA2, h/o sinus tachy, with two large ant fibroids, for IOL, s/p cervidil, s/p epidural, s/p AROM.  -cont EFM/toco  -cont pitocin for induction  -clear liquid diet, IVF  -pain management with epidural    Dr. Soto and Dr. Castro aware.  
A/P:  30y  at 39w1d, GBS neg , GDMA2, h/o sinus tachy, with two large ant fibroids, for IOL, s/p cervidil, s/p epidural, s/p AROM.  -cont EFM/toco  -cont pitocin for induction  -clear liquid diet, IVF  -pain management with epidural    Dr. Soto and Dr. Castro aware.

## 2021-04-01 NOTE — PROGRESS NOTE ADULT - SUBJECTIVE AND OBJECTIVE BOX
PGY 2 Note    Patient seen at bedside for evaluation of labor progression.  Feels pressure with contractions but comfortable s/p epidural.  With intermittent variable decelerations, resuscitated with left lateral position and O2.  Decreased pitocin to 10mu/min.    Vital Signs Last 24 Hrs  T(C): 36.8 (2021 03:58), Max: 36.9 (31 Mar 2021 06:00)  T(F): 98.24 (2021 03:58), Max: 98.42 (31 Mar 2021 06:00)  HR: 131 (2021 05:07) (73 - 131)  BP: 99/58 (2021 05:07) (73/40 - 131/95)  SpO2: 99% (2021 05:07) (92% - 100%)      EFM: 150/mod/+accel/intermittent variable decelerations, cat II  TOCO: q2-3m  SVE: 10/100/+1 @0505 per Dr. Castro    Labs:                        11.7   10.63 )-----------( 230      ( 31 Mar 2021 17:34 )             35.1             Urinalysis Basic - ( 30 Mar 2021 23:25 )    Color: Yellow / Appearance: Slightly Turbid / S.021 / pH: x  Gluc: x / Ketone: Negative  / Bili: Negative / Urobili: <2 mg/dL   Blood: x / Protein: Trace / Nitrite: Negative   Leuk Esterase: Negative / RBC: 2 /HPF / WBC 1 /HPF   Sq Epi: x / Non Sq Epi: 2 /HPF / Bacteria: Negative      FS 85/88/89/77/74/91/77/78  HIV NR  RPR NR  COVID-19 neg  UDS neg    Meds: BUpivacaine 0.1% in 0.9% Sodium Chloride PCEA 200 milliLiter(s) Epidural PCA Continuous, started @1550  lactated ringers. 1000 milliLiter(s) IV Continuous <Continuous>  oxytocin Infusion 333.333 milliUNIT(s)/Min IV Continuous <Continuous>  dinoprostone Insert 10 milliGRAM(s) Vaginal once, out at 1150  oxytocin Infusion. 2 milliUNIT(s)/Min IV Continuous <Continuous>, started @1340, currently at 10mu/min

## 2021-04-01 NOTE — PROGRESS NOTE ADULT - SUBJECTIVE AND OBJECTIVE BOX
PGY 2 Note    Patient seen at bedside for evaluation of labor progression.  Feels vaginal pain with contractions, s/p epidural.    Vital Signs Last 24 Hrs  T(C): 36.7 (31 Mar 2021 23:59), Max: 36.9 (31 Mar 2021 02:30)  T(F): 98.06 (31 Mar 2021 23:59), Max: 98.42 (31 Mar 2021 02:30)  HR: 89 (2021 01:51) (73 - 129)  BP: 111/70 (2021 01:51) (73/40 - 131/95)  SpO2: 100% (2021 01:47) (92% - 100%)      EFM:  TOCO:  SVE:     Labs:                        11.7   10.63 )-----------( 230      ( 31 Mar 2021 17:34 )             35.1             Urinalysis Basic - ( 30 Mar 2021 23:25 )    Color: Yellow / Appearance: Slightly Turbid / S.021 / pH: x  Gluc: x / Ketone: Negative  / Bili: Negative / Urobili: <2 mg/dL   Blood: x / Protein: Trace / Nitrite: Negative   Leuk Esterase: Negative / RBC: 2 /HPF / WBC 1 /HPF   Sq Epi: x / Non Sq Epi: 2 /HPF / Bacteria: Negative          Meds: BUpivacaine 0.1% in 0.9% Sodium Chloride PCEA 200 milliLiter(s) Epidural PCA Continuous  lactated ringers. 1000 milliLiter(s) IV Continuous <Continuous>  oxytocin Infusion 333.333 milliUNIT(s)/Min IV Continuous <Continuous>  oxytocin Infusion. 2 milliUNIT(s)/Min IV Continuous <Continuous>  dinoprostone Insert 10 milliGRAM(s) Vaginal once        A/P:  30y Gestational Age  , in labor  -cont EFM/toco  -clear liquid diet, IVF  -pain management prn/with epidural  -f/u    Dr. cast. PGY 2 Note    Patient seen at bedside for evaluation of labor progression.  Feels vaginal pain with contractions, s/p epidural.  With intermittent variable decelerations, down to 100bpm, recovering spontaneously.  Resuscitated with left lateral position and fluid bolus.    Vital Signs Last 24 Hrs  T(C): 36.7 (31 Mar 2021 23:59), Max: 36.9 (31 Mar 2021 02:30)  T(F): 98.06 (31 Mar 2021 23:59), Max: 98.42 (31 Mar 2021 02:30)  HR: 89 (2021 01:51) (73 - 129)  BP: 111/70 (2021 01:51) (73/40 - 131/95) first elevated @  SpO2: 100% (2021 01:47) (92% - 100%)    EFM: 145/mod/+accel/intermittent variable decelerations, cat II  TOCO: q4-5m  SVE: 4.5/90/0 @0129 per Dr. Soto    Labs:                        11.7   10.63 )-----------( 230      ( 31 Mar 2021 17:34 )             35.1       @1730 10.63>11.7/35.1<230, coags 11.7/1.02/28.9, fib >700     Urinalysis Basic - ( 30 Mar 2021 23:25 )    Color: Yellow / Appearance: Slightly Turbid / S.021 / pH: x  Gluc: x / Ketone: Negative  / Bili: Negative / Urobili: <2 mg/dL   Blood: x / Protein: Trace / Nitrite: Negative   Leuk Esterase: Negative / RBC: 2 /HPF / WBC 1 /HPF   Sq Epi: x / Non Sq Epi: 2 /HPF / Bacteria: Negative    FS 85/88/89/77/74  HIV NR  RPR NR  COVID-19 neg  UDS neg      Meds: BUpivacaine 0.1% in 0.9% Sodium Chloride PCEA 200 milliLiter(s) Epidural PCA Continuous, started @1550  oxytocin Infusion. 2 milliUNIT(s)/Min IV Continuous <Continuous>, started @1820, now at 12mu/min  dinoprostone Insert 10 milliGRAM(s) Vaginal once, out 3/31 @1150

## 2021-04-02 ENCOUNTER — TRANSCRIPTION ENCOUNTER (OUTPATIENT)
Age: 31
End: 2021-04-02

## 2021-04-02 VITALS
SYSTOLIC BLOOD PRESSURE: 106 MMHG | DIASTOLIC BLOOD PRESSURE: 62 MMHG | RESPIRATION RATE: 18 BRPM | HEART RATE: 96 BPM | TEMPERATURE: 98 F

## 2021-04-02 LAB
BASOPHILS # BLD AUTO: 0.04 K/UL — SIGNIFICANT CHANGE UP (ref 0–0.2)
BASOPHILS NFR BLD AUTO: 0.3 % — SIGNIFICANT CHANGE UP (ref 0–1)
COVID-19 SPIKE DOMAIN AB INTERP: NEGATIVE — SIGNIFICANT CHANGE UP
COVID-19 SPIKE DOMAIN ANTIBODY RESULT: 0.4 U/ML — SIGNIFICANT CHANGE UP
EOSINOPHIL # BLD AUTO: 0.19 K/UL — SIGNIFICANT CHANGE UP (ref 0–0.7)
EOSINOPHIL NFR BLD AUTO: 1.4 % — SIGNIFICANT CHANGE UP (ref 0–8)
HCT VFR BLD CALC: 33.1 % — LOW (ref 37–47)
HGB BLD-MCNC: 11 G/DL — LOW (ref 12–16)
IMM GRANULOCYTES NFR BLD AUTO: 0.3 % — SIGNIFICANT CHANGE UP (ref 0.1–0.3)
LYMPHOCYTES # BLD AUTO: 29.4 % — SIGNIFICANT CHANGE UP (ref 20.5–51.1)
LYMPHOCYTES # BLD AUTO: 3.9 K/UL — HIGH (ref 1.2–3.4)
MCHC RBC-ENTMCNC: 28.1 PG — SIGNIFICANT CHANGE UP (ref 27–31)
MCHC RBC-ENTMCNC: 33.2 G/DL — SIGNIFICANT CHANGE UP (ref 32–37)
MCV RBC AUTO: 84.7 FL — SIGNIFICANT CHANGE UP (ref 81–99)
MONOCYTES # BLD AUTO: 0.79 K/UL — HIGH (ref 0.1–0.6)
MONOCYTES NFR BLD AUTO: 5.9 % — SIGNIFICANT CHANGE UP (ref 1.7–9.3)
NEUTROPHILS # BLD AUTO: 8.32 K/UL — HIGH (ref 1.4–6.5)
NEUTROPHILS NFR BLD AUTO: 62.7 % — SIGNIFICANT CHANGE UP (ref 42.2–75.2)
NRBC # BLD: 0 /100 WBCS — SIGNIFICANT CHANGE UP (ref 0–0)
PLATELET # BLD AUTO: 219 K/UL — SIGNIFICANT CHANGE UP (ref 130–400)
RBC # BLD: 3.91 M/UL — LOW (ref 4.2–5.4)
RBC # FLD: 14.6 % — HIGH (ref 11.5–14.5)
SARS-COV-2 IGG+IGM SERPL QL IA: 0.4 U/ML — SIGNIFICANT CHANGE UP
SARS-COV-2 IGG+IGM SERPL QL IA: NEGATIVE — SIGNIFICANT CHANGE UP
WBC # BLD: 13.28 K/UL — HIGH (ref 4.8–10.8)
WBC # FLD AUTO: 13.28 K/UL — HIGH (ref 4.8–10.8)

## 2021-04-02 RX ORDER — IBUPROFEN 200 MG
1 TABLET ORAL
Qty: 0 | Refills: 0 | DISCHARGE
Start: 2021-04-02

## 2021-04-02 RX ORDER — ACETAMINOPHEN 500 MG
3 TABLET ORAL
Qty: 0 | Refills: 0 | DISCHARGE
Start: 2021-04-02

## 2021-04-02 RX ADMIN — Medication 600 MILLIGRAM(S): at 08:01

## 2021-04-02 RX ADMIN — Medication 975 MILLIGRAM(S): at 03:01

## 2021-04-02 RX ADMIN — Medication 600 MILLIGRAM(S): at 13:23

## 2021-04-02 RX ADMIN — MAGNESIUM HYDROXIDE 30 MILLILITER(S): 400 TABLET, CHEWABLE ORAL at 13:23

## 2021-04-02 RX ADMIN — Medication 600 MILLIGRAM(S): at 00:47

## 2021-04-02 RX ADMIN — Medication 1 TABLET(S): at 13:22

## 2021-04-02 NOTE — DISCHARGE NOTE OB - HOSPITAL COURSE
uncomplicated vaginal delivery, uncomplicated postpartum course, discharged home in stable condition

## 2021-04-02 NOTE — DISCHARGE NOTE OB - CARE PROVIDER_API CALL
Misael Castro  OBSTETRICS AND GYNECOLOGY  [address] [place] [address]  Phone: [phone]  Fax: [phone]  Established Patient  Follow Up Time: Routine

## 2021-04-02 NOTE — DISCHARGE NOTE OB - PATIENT PORTAL LINK FT
You can access the FollowMyHealth Patient Portal offered by [facility] by registering at the following website: [email or web address]. By joining [facility]’s FollowMyHealth portal, you will also be able to view your health information using other applications (apps) compatible with our system.

## 2021-04-02 NOTE — DISCHARGE NOTE OB - MEDICATION SUMMARY - MEDICATIONS TO TAKE

## 2021-04-02 NOTE — DISCHARGE NOTE OB - ADDITIONAL INSTRUCTIONS
If you expirence any of the following, please notify your provider:  -fever >100.4F  -increased vaginal bleeding or clotting (saturating a pad an hour)  -foul smelling discharge or bloody discharge from your incision site  -severe abdominal, vaginal, or rectal pain   -persistent headache or vision changes  -swollen areas on your legs that are red, hot, or painful   -swollen, hot, painful areas and/or streaks on your breasts  -cracked or bleeding nipples  -mood swings, depression, or crying spells lasting more than 3 days     Nothing in the vagina for 6 weeks. No intercourse for 6 weeks. No bath tubs, swimming pools, or hot tubs for 6 weeks.     Please schedule an appointment to see your physician in 6 weeks for a postpartum visit

## 2021-04-06 DIAGNOSIS — D25.9 LEIOMYOMA OF UTERUS, UNSPECIFIED: ICD-10-CM

## 2021-04-06 DIAGNOSIS — Z3A.38 38 WEEKS GESTATION OF PREGNANCY: ICD-10-CM

## 2021-04-06 DIAGNOSIS — O34.13 MATERNAL CARE FOR BENIGN TUMOR OF CORPUS UTERI, THIRD TRIMESTER: ICD-10-CM

## 2021-04-06 DIAGNOSIS — I95.9 HYPOTENSION, UNSPECIFIED: ICD-10-CM

## 2021-04-08 ENCOUNTER — APPOINTMENT (OUTPATIENT)
Dept: ANTEPARTUM | Facility: CLINIC | Age: 31
End: 2021-04-08

## 2022-02-23 NOTE — OB PROVIDER DELIVERY SUMMARY - NSPROVIDERDELIVERYNOTE_OBGYN_ALL_OB_FT
Hi Mr Court  I see in your chart that you were notified of the lung findings and appropriate follow up has been arranged.  Sounds good.    The only other finding of note was a hiatal hernia.  This just means the top of the stomach bulges up through the diaphragm into the chest, often causing bad heartburn.  If you are getting heartburn/ acid type symptoms, advise taking an acid reducer like famotidine ( pepcid ) on regular basis.    Take care    Bacilio Mckoy MD  
Viable male infant over 2nd degree laceration. APGARS 9/9   Laceration repaired with 2-0 Chromic in usual fashion.   "Luke"

## 2022-07-28 NOTE — OB PROVIDER H&P - NS_OBGYNHISTORY_OBGYN_ALL_OB_FT
Pt is scheduled for [facility] on Monday 8/8 at 10 am. Called pt and all instructions given including NPO after midnight . Pt verbalized understanding. Pt lives in [place]. Spoke to Komal at [facility], [place] cath lab. Pt will have rapid test since he is over an hour away from [facility] facility. Pt has f/u appt on 8/16 with Kindra PEREZ.  
ObHx: primigravid    GynHx: h/o uterine fibroids  denies h/o ovarian cysts, abnormal pap smears, STIs

## 2023-10-01 PROBLEM — M54.16 LUMBAR RADICULAR PAIN: Status: ACTIVE | Noted: 2020-10-15

## 2024-11-24 NOTE — OB RN DELIVERY SUMMARY - BABY A: APGAR 5 MIN MUSCLE TONE, DELIVERY
Proactively rounded on patient to find him resting comfortably with vital signs stable. His wife is at bedside and does not have any immediate questions or concerns regarding his current care plan. Nursing staff reports that his pain has been well controlled so far tonight with average pain scale of 2-4.      Ranulfo Cook, DO  [facility], PGY-III  [facility] Night Float     [facility] Hospitalist Pager numbers:   [facility] Hospitalist Medicine Team A (Lorena/Hope): [phone]  [facility] Hospitalist Medicine Team B (Marvin/Radha): [phone]   (2) well flexed